# Patient Record
Sex: FEMALE | Race: BLACK OR AFRICAN AMERICAN | NOT HISPANIC OR LATINO | Employment: FULL TIME | URBAN - METROPOLITAN AREA
[De-identification: names, ages, dates, MRNs, and addresses within clinical notes are randomized per-mention and may not be internally consistent; named-entity substitution may affect disease eponyms.]

---

## 2017-05-14 ENCOUNTER — HOSPITAL ENCOUNTER (EMERGENCY)
Facility: HOSPITAL | Age: 42
Discharge: HOME | End: 2017-05-14
Attending: EMERGENCY MEDICINE
Payer: MEDICAID

## 2017-05-14 VITALS
HEIGHT: 64 IN | OXYGEN SATURATION: 97 % | RESPIRATION RATE: 18 BRPM | DIASTOLIC BLOOD PRESSURE: 76 MMHG | HEART RATE: 70 BPM | TEMPERATURE: 98 F | BODY MASS INDEX: 23.39 KG/M2 | WEIGHT: 137 LBS | SYSTOLIC BLOOD PRESSURE: 157 MMHG

## 2017-05-14 DIAGNOSIS — R11.2 NAUSEA AND VOMITING, INTRACTABILITY OF VOMITING NOT SPECIFIED, UNSPECIFIED VOMITING TYPE: Primary | ICD-10-CM

## 2017-05-14 DIAGNOSIS — N12 PYELONEPHRITIS: ICD-10-CM

## 2017-05-14 DIAGNOSIS — D25.9 UTERINE LEIOMYOMA, UNSPECIFIED LOCATION: ICD-10-CM

## 2017-05-14 DIAGNOSIS — N93.9 VAGINAL BLEEDING: ICD-10-CM

## 2017-05-14 LAB
ALBUMIN SERPL BCP-MCNC: 4 G/DL
ALP SERPL-CCNC: 56 U/L
ALT SERPL W/O P-5'-P-CCNC: 8 U/L
ANION GAP SERPL CALC-SCNC: 9 MMOL/L
AST SERPL-CCNC: 13 U/L
B-HCG UR QL: NEGATIVE
BACTERIA #/AREA URNS HPF: ABNORMAL /HPF
BASOPHILS # BLD AUTO: 0.01 K/UL
BASOPHILS NFR BLD: 0.1 %
BILIRUB SERPL-MCNC: 0.7 MG/DL
BILIRUB UR QL STRIP: NEGATIVE
BUN SERPL-MCNC: 7 MG/DL
CALCIUM SERPL-MCNC: 10.3 MG/DL
CHLORIDE SERPL-SCNC: 102 MMOL/L
CLARITY UR: ABNORMAL
CO2 SERPL-SCNC: 26 MMOL/L
COLOR UR: YELLOW
CREAT SERPL-MCNC: 0.8 MG/DL
CTP QC/QA: YES
DIFFERENTIAL METHOD: ABNORMAL
EOSINOPHIL # BLD AUTO: 0.1 K/UL
EOSINOPHIL NFR BLD: 0.9 %
ERYTHROCYTE [DISTWIDTH] IN BLOOD BY AUTOMATED COUNT: 12.7 %
EST. GFR  (AFRICAN AMERICAN): >60 ML/MIN/1.73 M^2
EST. GFR  (NON AFRICAN AMERICAN): >60 ML/MIN/1.73 M^2
GLUCOSE SERPL-MCNC: 103 MG/DL
GLUCOSE UR QL STRIP: NEGATIVE
HCT VFR BLD AUTO: 32.6 %
HGB BLD-MCNC: 11.3 G/DL
HGB UR QL STRIP: ABNORMAL
HYALINE CASTS #/AREA URNS LPF: 0 /LPF
KETONES UR QL STRIP: ABNORMAL
LEUKOCYTE ESTERASE UR QL STRIP: ABNORMAL
LIPASE SERPL-CCNC: 3 U/L
LYMPHOCYTES # BLD AUTO: 1.1 K/UL
LYMPHOCYTES NFR BLD: 14.1 %
MCH RBC QN AUTO: 30 PG
MCHC RBC AUTO-ENTMCNC: 34.7 %
MCV RBC AUTO: 87 FL
MICROSCOPIC COMMENT: ABNORMAL
MONOCYTES # BLD AUTO: 0.6 K/UL
MONOCYTES NFR BLD: 7.5 %
NEUTROPHILS # BLD AUTO: 6.2 K/UL
NEUTROPHILS NFR BLD: 77.3 %
NITRITE UR QL STRIP: POSITIVE
PH UR STRIP: 6 [PH] (ref 5–8)
PLATELET # BLD AUTO: 345 K/UL
PMV BLD AUTO: 9.2 FL
POTASSIUM SERPL-SCNC: 3.2 MMOL/L
PROT SERPL-MCNC: 8.4 G/DL
PROT UR QL STRIP: ABNORMAL
RBC # BLD AUTO: 3.77 M/UL
RBC #/AREA URNS HPF: >100 /HPF (ref 0–4)
SODIUM SERPL-SCNC: 137 MMOL/L
SP GR UR STRIP: 1.01 (ref 1–1.03)
URN SPEC COLLECT METH UR: ABNORMAL
UROBILINOGEN UR STRIP-ACNC: ABNORMAL EU/DL
WBC # BLD AUTO: 8.02 K/UL
WBC #/AREA URNS HPF: >100 /HPF (ref 0–5)
WBC CLUMPS URNS QL MICRO: ABNORMAL

## 2017-05-14 PROCEDURE — 81025 URINE PREGNANCY TEST: CPT | Performed by: EMERGENCY MEDICINE

## 2017-05-14 PROCEDURE — 87186 SC STD MICRODIL/AGAR DIL: CPT

## 2017-05-14 PROCEDURE — 87077 CULTURE AEROBIC IDENTIFY: CPT

## 2017-05-14 PROCEDURE — 25000003 PHARM REV CODE 250: Performed by: NURSE PRACTITIONER

## 2017-05-14 PROCEDURE — 81000 URINALYSIS NONAUTO W/SCOPE: CPT

## 2017-05-14 PROCEDURE — 87086 URINE CULTURE/COLONY COUNT: CPT

## 2017-05-14 PROCEDURE — 99285 EMERGENCY DEPT VISIT HI MDM: CPT | Mod: 25

## 2017-05-14 PROCEDURE — 87088 URINE BACTERIA CULTURE: CPT

## 2017-05-14 PROCEDURE — 85025 COMPLETE CBC W/AUTO DIFF WBC: CPT

## 2017-05-14 PROCEDURE — 96375 TX/PRO/DX INJ NEW DRUG ADDON: CPT

## 2017-05-14 PROCEDURE — 63600175 PHARM REV CODE 636 W HCPCS: Performed by: NURSE PRACTITIONER

## 2017-05-14 PROCEDURE — 80053 COMPREHEN METABOLIC PANEL: CPT

## 2017-05-14 PROCEDURE — 83690 ASSAY OF LIPASE: CPT

## 2017-05-14 PROCEDURE — 96365 THER/PROPH/DIAG IV INF INIT: CPT

## 2017-05-14 RX ORDER — NAPROXEN 500 MG/1
500 TABLET ORAL 2 TIMES DAILY
COMMUNITY
End: 2021-08-24

## 2017-05-14 RX ORDER — CEPHALEXIN 500 MG/1
500 CAPSULE ORAL EVERY 12 HOURS
Qty: 20 CAPSULE | Refills: 0 | Status: SHIPPED | OUTPATIENT
Start: 2017-05-14 | End: 2017-05-24

## 2017-05-14 RX ORDER — ONDANSETRON 2 MG/ML
4 INJECTION INTRAMUSCULAR; INTRAVENOUS
Status: COMPLETED | OUTPATIENT
Start: 2017-05-14 | End: 2017-05-14

## 2017-05-14 RX ORDER — HYDROMORPHONE HYDROCHLORIDE 2 MG/ML
1 INJECTION, SOLUTION INTRAMUSCULAR; INTRAVENOUS; SUBCUTANEOUS
Status: COMPLETED | OUTPATIENT
Start: 2017-05-14 | End: 2017-05-14

## 2017-05-14 RX ORDER — HYDROCODONE BITARTRATE AND ACETAMINOPHEN 5; 325 MG/1; MG/1
1 TABLET ORAL EVERY 4 HOURS PRN
Qty: 8 TABLET | Refills: 0 | Status: SHIPPED | OUTPATIENT
Start: 2017-05-14 | End: 2017-05-24

## 2017-05-14 RX ORDER — NAPROXEN 500 MG/1
500 TABLET ORAL 2 TIMES DAILY WITH MEALS
Qty: 60 TABLET | Refills: 0 | Status: SHIPPED | OUTPATIENT
Start: 2017-05-14 | End: 2018-03-19

## 2017-05-14 RX ORDER — ONDANSETRON 4 MG/1
4 TABLET, ORALLY DISINTEGRATING ORAL EVERY 8 HOURS PRN
Qty: 15 TABLET | Refills: 0 | Status: SHIPPED | OUTPATIENT
Start: 2017-05-14 | End: 2018-03-19

## 2017-05-14 RX ADMIN — SODIUM CHLORIDE 1000 ML: 0.9 INJECTION, SOLUTION INTRAVENOUS at 09:05

## 2017-05-14 RX ADMIN — CEFTRIAXONE 1 G: 1 INJECTION, SOLUTION INTRAVENOUS at 09:05

## 2017-05-14 RX ADMIN — ONDANSETRON 4 MG: 2 INJECTION INTRAMUSCULAR; INTRAVENOUS at 09:05

## 2017-05-14 RX ADMIN — HYDROMORPHONE HYDROCHLORIDE 1 MG: 2 INJECTION INTRAMUSCULAR; INTRAVENOUS; SUBCUTANEOUS at 09:05

## 2017-05-14 NOTE — DISCHARGE INSTRUCTIONS
Please get plenty of rest and drink lots of water to stay well-hydrated.    Try to start with clear liquids, and if tolerated, you can graduate to solid foods.  Eat a bland diet; avoid eating spicy, greasy, and acidic foods (see handout).    Take Keflex twice daily for 10 days for your kidney infection.    Take zofran for nausea or vomiting.    You should take naproxen for pain and inflammation, with food.  Never take this medication on an empty stomach.  You can take Norco for breakthrough pain, as needed.  Norco may cause drowsiness, so please use with caution.    Follow up with your regular doctor in 2-3 days for further evaluation of your symptoms, if they persist. If you do not have a regular doctor, you can make an appointment at the number above, or refer to our community resources page for nearby clinics.      Return to the ER for any new/worsening symptoms.

## 2017-05-14 NOTE — ED TRIAGE NOTES
Reports LMP 5/1/17. Had PAP on 5/11/17. Has had vag bleeding since PAP. ALSO, c/o vomiting , back pain , lower abd pain x 1 day.

## 2017-05-14 NOTE — ED PROVIDER NOTES
"Encounter Date: 5/14/2017    SCRIBE #1 NOTE: I, Marcos Madsen, am scribing for, and in the presence of,  Yu Graham NP. I have scribed the following portions of the note - Other sections scribed: HPI and ROS.       History     Chief Complaint   Patient presents with    Emesis     pt c/o vomiting,lower back pain, vaginal bleeding and cramping since paps smear on the 11th.     Review of patient's allergies indicates:  No Known Allergies  HPI Comments: Chief Complaint: Pelvic Pain    HPI: This is a 40 y/o female with HTN and a hx of fibroids that comes to the ED c/o pelvic pain, back pain, and vaginal bleeding. Pt reports that symptoms started the day after she had a routine pelvic exam with , her OBGYN.  During exam, pt reports that she was asymptomatic, but she awoke the next morning with severe pelvic pain and vaginal bleeding.  She relates concern because she already had her menses on 5/01/17 and the bleeding was moderate and the pelvic pain was so severe that she reports she was "doubled over in pain".  She reports associated lack of appetite, nausea, chills, nausea, vomiting, and bodyaches.  She reports using 3 tampons yesterday and saturating the last tampon within 2 hours. She denies prior treatments or medications. She denies urinary symptoms, diarrhea, blood in stool.    The history is provided by the patient. No  was used.     Past Medical History:   Diagnosis Date    Hypertension      Past Surgical History:   Procedure Laterality Date    HERNIA REPAIR      TUBAL LIGATION       History reviewed. No pertinent family history.  Social History   Substance Use Topics    Smoking status: Never Smoker    Smokeless tobacco: None    Alcohol use No     Review of Systems   Constitutional: Positive for appetite change and chills. Negative for fever.   HENT: Negative for congestion and sore throat.    Respiratory: Negative for cough and shortness of breath.    Cardiovascular: Negative " for chest pain and palpitations.   Gastrointestinal: Positive for nausea and vomiting. Negative for abdominal pain, constipation and diarrhea.   Genitourinary: Positive for pelvic pain and vaginal bleeding. Negative for difficulty urinating and dysuria.   Musculoskeletal: Positive for back pain and myalgias.   Skin: Negative for rash.   Neurological: Negative for dizziness, weakness, light-headedness and headaches.       Physical Exam   Initial Vitals   BP Pulse Resp Temp SpO2   05/14/17 0725 05/14/17 0725 05/14/17 0725 05/14/17 0725 05/14/17 0725   135/91 88 18 98.6 °F (37 °C) 100 %     Physical Exam    Nursing note and vitals reviewed.  Constitutional: Vital signs are normal. She appears well-developed and well-nourished. She is not diaphoretic. She is active and cooperative.  Non-toxic appearance.   She appears ill and uncomfortable. Dry-heaving into emesis bag during exam.   Cardiovascular: Normal heart sounds.   Pulmonary/Chest: Effort normal and breath sounds normal. No respiratory distress. She has no wheezes. She has no rhonchi. She has no rales.   Abdominal: Soft. Normal appearance. She exhibits no distension and no mass. There is tenderness (lower abdomen/pelvic). There is no rigidity, no rebound, no guarding, no CVA tenderness, no tenderness at McBurney's point and negative Alonzo's sign. No hernia.   Genitourinary: Pelvic exam was performed with patient supine. There is no rash or tenderness on the right labia. There is no rash or tenderness on the left labia. Cervix exhibits no motion tenderness, no discharge and no friability. Right adnexum displays no mass. Left adnexum displays no mass. There is bleeding (moderate dark blood in vault) in the vagina. No tenderness in the vagina. No signs of injury around the vagina. No vaginal discharge found.   Neurological: She is alert and oriented to person, place, and time.   Skin: Skin is warm and dry. No pallor.   Psychiatric: She has a normal mood and affect.          ED Course   Procedures  Labs Reviewed   CBC W/ AUTO DIFFERENTIAL - Abnormal; Notable for the following:        Result Value    RBC 3.77 (*)     Hemoglobin 11.3 (*)     Hematocrit 32.6 (*)     Gran% 77.3 (*)     Lymph% 14.1 (*)     All other components within normal limits   COMPREHENSIVE METABOLIC PANEL - Abnormal; Notable for the following:     Potassium 3.2 (*)     ALT 8 (*)     All other components within normal limits   LIPASE - Abnormal; Notable for the following:     Lipase 3 (*)     All other components within normal limits   URINALYSIS - Abnormal; Notable for the following:     Appearance, UA Hazy (*)     Protein, UA 2+ (*)     Ketones, UA 1+ (*)     Occult Blood UA 3+ (*)     Nitrite, UA Positive (*)     Urobilinogen, UA 4.0-6.0 (*)     Leukocytes, UA 2+ (*)     All other components within normal limits   URINALYSIS MICROSCOPIC - Abnormal; Notable for the following:     RBC, UA >100 (*)     WBC, UA >100 (*)     WBC Clumps, UA Moderate (*)     Bacteria, UA Moderate (*)     All other components within normal limits   CULTURE, URINE   POCT URINE PREGNANCY                Additional MDM:   Comments: This is an urgent evaluation of a 42 y/o female that presents to the ER with pelvic pain and vaginal bleeding x 2 days. Pt reports associated pain that radiates to her lumbar back, nausea, vomiting, bodyaches.  She appears ill, but is afebrile with normal VS.  There is moderate tenderness over the lower abdomen and pelvis. Negative murphys and mcburney's and no peritoneal signs. Her UPT is negative.  DDx included: UTI, renal colic, torsion, ovarian cysts, fibroids, endometriosis, vaginal infection. Appendicitis, diverticulitis, bowel obstruction, and colitis considered less likely.  She was referred for labs, urine, pelvic, and pelvic ultrasound.  Pelvic exam reveals moderate dark blood in the vault, but is otherwise unremarkable with no adnexal masses or evidence of infectious process.    Pelvic ultrasound  revealed 2 uterine fibroids in the fundus, but otherwise was normal.  There is no evidence of torsion, free fluid, ovarian cysts.  Urine was consistent with infection with 2+ leukocytes, pyuria, WBC clumps, bacteria, and positive nitrates.  Culture sent.  Her labs were otherwise unremarkable, with no significant leukocytosis, anemia, renal sufficiency, electrolyte disturbance, or transaminitis.  Based on the above, I suspect her symptoms are most likely due to an early pyelonephritis.  I highly doubt acute surgical abdomen such as appendicitis or diverticulitis at this time.  I see no indication for further imaging.  Patient was given 1 g of Rocephin in the ED and will be discharged with prescription for Keflex.  Pending culture report.  She appears more comfortable on reevaluation and is tolerating liquids.  I advised the patient follow-up with her primary care doctor within 2-3 days for further evaluation of her symptoms.  She was given strict return precautions for any new or worsening symptoms or concerns, which she verbalized understanding and adherence.  She was encouraged supportive care: Oral hydration, rest, bland diet.  Rx antiemetics for supportive care.  Case discussed with attending, , and he is in agreement with plan. Stable for discharge and outpatient follow.  .          Scribe Attestation:   Scribe #1: I performed the above scribed service and the documentation accurately describes the services I performed. I attest to the accuracy of the note.    Attending Attestation:     Physician Attestation Statement for NP/PA:   I discussed this assessment and plan of this patient with the NP/PA, but I did not personally examine the patient. The face to face encounter was performed by the NP/PA.    Other NP/PA Attestation Additions:      Medical Decision Making: This is the emergent evaluation of a 41-year-old female presents the emergency department for evaluation of vomiting, lower back pain,  vaginal bleeding, cramping.  I agree with the treatment plan and evaluation as outlined by the midlevel provider.  No findings to suggest acute appendicitis at this time.  Urinalysis reveals findings concerning for urinary tract infection which is likely pyelonephritis in the setting of the patient's other symptoms.  Additionally, ultrasound of the pelvis revealed uterine fibroids which the patient is known to have.  Patient is not vomiting at this time.  She is afebrile and nontoxic appearing.  She does not appear septic.  She is safe and stable for outpatient treatment.  She did receive IV antibiotics in the emergency department for discharge.  She was advised to follow-up with her PCP this week and return for any new or worsening symptoms such as fever, intractable vomiting, or different/worsening pain.       Physician Attestation for Scribe:  Physician Attestation Statement for Scribe #1: I, Yu Graham NP, reviewed documentation, as scribed by Marcos Madsen in my presence, and it is both accurate and complete.                 ED Course     Clinical Impression:   The primary encounter diagnosis was Nausea and vomiting, intractability of vomiting not specified, unspecified vomiting type. Diagnoses of Vaginal bleeding, Uterine leiomyoma, unspecified location, and Pyelonephritis were also pertinent to this visit.    Disposition:   Disposition: Discharged  Condition: Stable       Yu Graham NP  05/15/17 0337

## 2017-05-14 NOTE — ED AVS SNAPSHOT
[facility]  [place] [address]               Latonia Zepeda   2017  7:34 AM   ED    Description:  Female : 1975   Department:  [facility]           Your Care was Coordinated By:     Provider Role From To    Timi Gillis Jr., MD Attending Provider 17 0739 --    Yu Graham NP Nurse Practitioner 17 0739 --      Reason for Visit     Emesis           Diagnoses this Visit        Comments    Nausea and vomiting, intractability of vomiting not specified, unspecified vomiting type    -  Primary     Vaginal bleeding         Uterine leiomyoma, unspecified location         Pyelonephritis           ED Disposition     ED Disposition Condition Comment    Discharge             To Do List           Follow-up Information     Follow up with [facility].    Specialty:  Emergency Medicine    Why:  If symptoms worsen    Contact information:    [address] [place] [address] [phone]        Schedule an appointment as soon as possible for a visit with Nilson Tolentino MD.    Specialty:  Family Medicine    Why:  PRIMARY CARE - CALL FOR APPOINTMENT FOR RE-EVALUATION OR ROUTINE FOLLOW    Contact information:    [address] [facility] [id number] [phone]         These Medications        Disp Refills Start End    cephALEXin (KEFLEX) 500 MG capsule 20 capsule 0 2017    Take 1 capsule (500 mg total) by mouth every 12 (twelve) hours. - Oral    ondansetron (ZOFRAN-ODT) 4 MG TbDL 15 tablet 0 2017     Take 1 tablet (4 mg total) by mouth every 8 (eight) hours as needed. - Oral    hydrocodone-acetaminophen 5-325mg (NORCO) 5-325 mg per tablet 8 tablet 0 2017    Take 1 tablet by mouth every 4 (four) hours as needed (Breakthrough pain). May cause drowsiness - Oral    naproxen (NAPROSYN) 500 MG tablet 60 tablet 0 2017     Take 1 tablet (500 mg total) by  mouth 2 (two) times daily with meals. - Oral      [facility]     [facility] Nurse Care Line - 24/7 Assistance  Unless otherwise directed by your provider, please contact [facility], our nurse care line that is available for 24/7 assistance.     Registered nurses in the [facility] provide: appointment scheduling, clinical advisement, health education, and other advisory services.  Call: [phone] (toll free)               Medications           Message regarding Medications     Verify the changes and/or additions to your medication regime listed below are the same as discussed with your clinician today.  If any of these changes or additions are incorrect, please notify your healthcare provider.        START taking these NEW medications        Refills    cephALEXin (KEFLEX) 500 MG capsule 0    Sig: Take 1 capsule (500 mg total) by mouth every 12 (twelve) hours.    Class: Print    Route: Oral    ondansetron (ZOFRAN-ODT) 4 MG TbDL 0    Sig: Take 1 tablet (4 mg total) by mouth every 8 (eight) hours as needed.    Class: Print    Route: Oral    hydrocodone-acetaminophen 5-325mg (NORCO) 5-325 mg per tablet 0    Sig: Take 1 tablet by mouth every 4 (four) hours as needed (Breakthrough pain). May cause drowsiness    Class: Print    Route: Oral    naproxen (NAPROSYN) 500 MG tablet 0    Sig: Take 1 tablet (500 mg total) by mouth 2 (two) times daily with meals.    Class: Print    Route: Oral      These medications were administered today        Dose Freq    hydromorphone (PF) injection 1 mg 1 mg ED 1 Time    Sig: Inject 0.5 mLs (1 mg total) into the vein ED 1 Time.    Class: Normal    Route: Intravenous    ondansetron injection 4 mg 4 mg ED 1 Time    Sig: Inject 4 mg into the vein ED 1 Time.    Class: Normal    Route: Intravenous    sodium chloride 0.9% bolus 1,000 mL 1,000 mL ED 1 Time    Sig: Inject 1,000 mLs into the vein ED 1 Time.    Class: Normal    Route: Intravenous    cefTRIAXone (ROCEPHIN)  "1 g in dextrose 5 % 50 mL IVPB 1 g ED 1 Time    Sig: Inject 50 mLs (1 g total) into the vein ED 1 Time.    Class: Normal    Route: Intravenous    ondansetron injection 4 mg 4 mg ED 1 Time    Sig: Inject 4 mg into the vein ED 1 Time.    Class: Normal    Route: Intravenous           Verify that the below list of medications is an accurate representation of the medications you are currently taking.  If none reported, the list may be blank. If incorrect, please contact your healthcare provider. Carry this list with you in case of emergency.           Current Medications     naproxen (EC NAPROSYN) 500 MG EC tablet Take 500 mg by mouth 2 (two) times daily.    cephALEXin (KEFLEX) 500 MG capsule Take 1 capsule (500 mg total) by mouth every 12 (twelve) hours.    hydrocodone-acetaminophen 5-325mg (NORCO) 5-325 mg per tablet Take 1 tablet by mouth every 4 (four) hours as needed (Breakthrough pain). May cause drowsiness    naproxen (NAPROSYN) 500 MG tablet Take 1 tablet (500 mg total) by mouth 2 (two) times daily with meals.    ondansetron (ZOFRAN-ODT) 4 MG TbDL Take 1 tablet (4 mg total) by mouth every 8 (eight) hours as needed.           Clinical Reference Information           Your Vitals Were     BP Pulse Temp Resp Height Weight    157/76 (BP Location: Left arm, Patient Position: Sitting, BP Method: Automatic) 70 97.8 °F (36.6 °C) (Oral) 18 5' 4" (1.626 m) 62.1 kg (137 lb)    Last Period SpO2 BMI          05/01/2017 97% 23.52 kg/m2        Allergies as of 5/14/2017     No Known Allergies      Immunizations Administered on Date of Encounter - 5/14/2017     None      ED Micro, Lab, POCT     Start Ordered       Status Ordering Provider    05/14/17 0940 05/14/17 0939  Urine culture **CANNOT BE ORDERED STAT**  Once      In process     05/14/17 0806 05/14/17 0806  Urinalysis Microscopic  Once      Final result     05/14/17 0802 05/14/17 0806  CBC W/ AUTO DIFFERENTIAL  STAT      Final result     05/14/17 0802 05/14/17 0806  Comp. " Metabolic Panel  STAT      Final result     05/14/17 0802 05/14/17 0806  Lipase  Once      Final result     05/14/17 0802 05/14/17 0806  Urinalysis - Clean Catch  STAT      Final result     05/14/17 0728 05/14/17 0727  POCT urine pregnancy  Once      Final result       ED Imaging Orders     Start Ordered       Status Ordering Provider    05/14/17 0805 05/14/17 0806  US Pelvis Comp with Transvag NON-OB (xpd  1 time imaging      Final result         Discharge Instructions       Please get plenty of rest and drink lots of water to stay well-hydrated.    Try to start with clear liquids, and if tolerated, you can graduate to solid foods.  Eat a bland diet; avoid eating spicy, greasy, and acidic foods (see handout).    Take Keflex twice daily for 10 days for your kidney infection.    Take zofran for nausea or vomiting.    You should take naproxen for pain and inflammation, with food.  Never take this medication on an empty stomach.  You can take Norco for breakthrough pain, as needed.  Norco may cause drowsiness, so please use with caution.    Follow up with your regular doctor in 2-3 days for further evaluation of your symptoms, if they persist. If you do not have a regular doctor, you can make an appointment at the number above, or refer to our community resources page for nearby clinics.      Return to the ER for any new/worsening symptoms.        Discharge References/Attachments     PYELONEPHRITIS, DISCHARGE INSTRUCTIONS (ENGLISH)    PYELONEPHRITIS, FEMALE (ADULT) (ENGLISH)      [facility] Sign-Up     Activating your [facility] account is as easy as 1-2-3!     1) Visit [email or web address], select Sign Up Now, enter this activation code and your date of birth, then select Next.  [email or web address]  Expires: 6/28/2017 11:05 AM      2) Create a username and password to use when you visit [facility] in the future and select a security question in case you lose your password and select Next.    3) Enter your e-mail address and  click Sign Up!    Additional Information  If you have questions, please e-mail [email or web address] or call [phone] to talk to our [facility] staff. Remember, [facility] is NOT to be used for urgent needs. For medical emergencies, dial 911.          [facility] complies with applicable Federal civil rights laws and does not discriminate on the basis of race, color, national origin, age, disability, or sex.        Language Assistance Services     ATTENTION: Language assistance services are available, free of charge. Please call [phone].      ATENCIÓN: Si habla español, tiene a samuel disposición servicios gratuitos de asistencia lingüística. Llame al [phone].     CHÚ Ý: N?u b?n nói Ti?ng Vi?t, có các d?ch v? h? tr? ngôn ng? mi?n phí dành cho b?n. G?i s? [phone].

## 2017-05-16 LAB — BACTERIA UR CULT: NORMAL

## 2018-03-09 ENCOUNTER — HOSPITAL ENCOUNTER (EMERGENCY)
Facility: HOSPITAL | Age: 43
Discharge: HOME | End: 2018-03-09
Attending: EMERGENCY MEDICINE
Payer: MEDICAID

## 2018-03-09 VITALS
TEMPERATURE: 97 F | RESPIRATION RATE: 19 BRPM | SYSTOLIC BLOOD PRESSURE: 137 MMHG | BODY MASS INDEX: 24.92 KG/M2 | WEIGHT: 146 LBS | OXYGEN SATURATION: 99 % | HEIGHT: 64 IN | HEART RATE: 73 BPM | DIASTOLIC BLOOD PRESSURE: 96 MMHG

## 2018-03-09 DIAGNOSIS — L30.9 DERMATITIS: Primary | ICD-10-CM

## 2018-03-09 LAB
B-HCG UR QL: NEGATIVE
CTP QC/QA: YES

## 2018-03-09 PROCEDURE — 96372 THER/PROPH/DIAG INJ SC/IM: CPT

## 2018-03-09 PROCEDURE — 99283 EMERGENCY DEPT VISIT LOW MDM: CPT | Mod: 25

## 2018-03-09 PROCEDURE — 86592 SYPHILIS TEST NON-TREP QUAL: CPT

## 2018-03-09 PROCEDURE — 63600175 PHARM REV CODE 636 W HCPCS: Performed by: PHYSICIAN ASSISTANT

## 2018-03-09 PROCEDURE — 81025 URINE PREGNANCY TEST: CPT | Performed by: PHYSICIAN ASSISTANT

## 2018-03-09 RX ORDER — TRIAMCINOLONE ACETONIDE 1 MG/G
CREAM TOPICAL 2 TIMES DAILY
Qty: 45 G | Refills: 0 | Status: SHIPPED | OUTPATIENT
Start: 2018-03-09 | End: 2018-03-19

## 2018-03-09 RX ORDER — CYPROHEPTADINE HYDROCHLORIDE 4 MG/1
4 TABLET ORAL 3 TIMES DAILY PRN
COMMUNITY
End: 2021-08-24

## 2018-03-09 RX ORDER — HYDROXYZINE HYDROCHLORIDE 25 MG/1
25 TABLET, FILM COATED ORAL EVERY 6 HOURS
Qty: 12 TABLET | Refills: 0 | OUTPATIENT
Start: 2018-03-09 | End: 2021-08-24

## 2018-03-09 RX ORDER — HYDROXYZINE PAMOATE 25 MG/1
25 CAPSULE ORAL
Status: DISCONTINUED | OUTPATIENT
Start: 2018-03-09 | End: 2018-03-09

## 2018-03-09 RX ORDER — DIPHENHYDRAMINE HYDROCHLORIDE 50 MG/ML
25 INJECTION INTRAMUSCULAR; INTRAVENOUS
Status: COMPLETED | OUTPATIENT
Start: 2018-03-09 | End: 2018-03-09

## 2018-03-09 RX ADMIN — DIPHENHYDRAMINE HYDROCHLORIDE 25 MG: 50 INJECTION, SOLUTION INTRAMUSCULAR; INTRAVENOUS at 10:03

## 2018-03-10 NOTE — ED NOTES
"Presented pt. With order medication ( hydroxyzine) and she stated she perferred to have a shot and not the pill. Pt. States she has been taking benadryl all day without any results and would like the "shot". Explained to pt. That she has not taken hydroxyzine, pt. States she has a hard time swollen pills. Provider made aware of pt. Preference.   "

## 2018-03-10 NOTE — ED PROVIDER NOTES
"Encounter Date: 3/9/2018    SCRIBE #1 NOTE: I, Ame Arianna, am scribing for, and in the presence of,  Vince Romero PA-C. I have scribed the following portions of the note - Other sections scribed: HPI and ROS.       History     Chief Complaint   Patient presents with    Rash     Pt reports rash on both hands, feet, & left arm onset 2 days ago with itching, been taking benadryl no relief.     CC: Rash    HPI: The pt is a 42 y.o. F with a PMHx of HTN and chlamydia who presents to the ED c/o rash to L arm, L hand, R hand, and L foot w/ constant itch for the past 2 days. Pt reports that she also noticed some bumps on her L foot. Pt says that she has taken benadryl without relief. She otherwise denies recent contact w/ anyone who has rashes as well as fever, arthralgia, sore throat, dysuria, back/chest rash and other associated symptoms.      The history is provided by the patient. No  was used.     Review of patient's allergies indicates:  No Known Allergies  Past Medical History:   Diagnosis Date    Hypertension      Past Surgical History:   Procedure Laterality Date    HERNIA REPAIR      HYSTERECTOMY      TUBAL LIGATION       History reviewed. No pertinent family history.  Social History   Substance Use Topics    Smoking status: Never Smoker    Smokeless tobacco: Not on file    Alcohol use No     Review of Systems   Constitutional: Negative for chills, diaphoresis and fever.   HENT: Negative for ear pain and sore throat.    Eyes: Negative for redness.   Respiratory: Negative for cough and shortness of breath.    Cardiovascular: Negative for chest pain.   Gastrointestinal: Negative for abdominal pain, diarrhea, nausea and vomiting.   Genitourinary: Negative for dysuria.   Musculoskeletal: Negative for arthralgias and back pain.   Skin: Positive for rash (L arm, L hand, R hand, L foot).        (+) L foot "bumps"  (-) back or chest rash   Neurological: Negative for headaches.       Physical Exam "     Initial Vitals [03/09/18 2055]   BP Pulse Resp Temp SpO2   136/85 88 16 98.7 °F (37.1 °C) 99 %      MAP       102         Physical Exam    Vitals reviewed.  Constitutional: She appears well-developed and well-nourished. She is not diaphoretic. No distress.   HENT:   Head: Normocephalic and atraumatic.   Right Ear: External ear normal.   Left Ear: External ear normal.   Nose: Nose normal.   Eyes: Conjunctivae are normal. No scleral icterus.   Neck: Normal range of motion. Neck supple.   Cardiovascular: Normal rate, regular rhythm, normal heart sounds and intact distal pulses.   Pulmonary/Chest: Breath sounds normal. No respiratory distress. She has no wheezes. She has no rhonchi. She has no rales. She exhibits no tenderness.   Musculoskeletal: Normal range of motion.   Neurological: She is alert and oriented to person, place, and time.   Skin: Skin is warm and dry. Rash noted.   Papular pruritic rash to bilateral arms, concentrated in the flexural surfaces of the elbows, also found on the palms and soles bilaterally.  The lesions are dry papular lesions with some crusting and excoriations.  No large bulla or sloughing.         ED Course   Procedures  Labs Reviewed   RPR   POCT URINE PREGNANCY             Medical Decision Making:   Initial Assessment:   42-year-old female with no significant history complains of pruritic papular rash to arms, hands, and feet.  She denies arthralgias, fever, shortness of breath.  No history of eczema, however, her son does have eczema.  She presents well-appearing in no distress, afebrile, with reassuring vital signs.  She has a papular dry pruritic rash to bilateral hands including the palms, and most severe on the flexor surfaces of the elbows.  She also has some papules on the soles of her feet.  No lesions to the interdigital skin.  No linear lesions.  No bolus lesions or skin sloughing.  Most consistent with atopic dermatitis, but will screen for syphilis.  Differential  Diagnosis:   Atopic dermatitis, other dermatitis, and less likely syphilis, infestation  ED Management:  Patient treated with antihistamine and topical steroid.  She was discharged with return precautions and given dermatology information for follow-up as needed.  She verbalized understanding and agreed with plan.            Scribe Attestation:   Scribe #1: I performed the above scribed service and the documentation accurately describes the services I performed. I attest to the accuracy of the note.    Attending Attestation:           Physician Attestation for Scribe:  Physician Attestation Statement for Scribe #1: I, Vince Romero PA-C, reviewed documentation, as scribed by Ame Keller in my presence, and it is both accurate and complete.                    Clinical Impression:   The encounter diagnosis was Dermatitis.                           Vince Romero PA-C  03/09/18 0288

## 2018-03-12 LAB — RPR SER QL: NORMAL

## 2018-03-19 ENCOUNTER — HOSPITAL ENCOUNTER (EMERGENCY)
Facility: HOSPITAL | Age: 43
Discharge: HOME | End: 2018-03-19
Attending: EMERGENCY MEDICINE
Payer: MEDICAID

## 2018-03-19 VITALS
OXYGEN SATURATION: 98 % | HEIGHT: 65 IN | DIASTOLIC BLOOD PRESSURE: 63 MMHG | SYSTOLIC BLOOD PRESSURE: 134 MMHG | WEIGHT: 130 LBS | TEMPERATURE: 98 F | BODY MASS INDEX: 21.66 KG/M2 | RESPIRATION RATE: 20 BRPM | HEART RATE: 86 BPM

## 2018-03-19 DIAGNOSIS — L03.115 CELLULITIS OF RIGHT FOOT: ICD-10-CM

## 2018-03-19 DIAGNOSIS — B35.3 TINEA PEDIS OF BOTH FEET: ICD-10-CM

## 2018-03-19 DIAGNOSIS — B86 SCABIES: Primary | ICD-10-CM

## 2018-03-19 PROCEDURE — 99283 EMERGENCY DEPT VISIT LOW MDM: CPT

## 2018-03-19 RX ORDER — SULFAMETHOXAZOLE AND TRIMETHOPRIM 800; 160 MG/1; MG/1
1 TABLET ORAL 2 TIMES DAILY
Qty: 14 TABLET | Refills: 0 | Status: SHIPPED | OUTPATIENT
Start: 2018-03-19 | End: 2018-03-26

## 2018-03-19 RX ORDER — CLOTRIMAZOLE 1 %
CREAM (GRAM) TOPICAL
Qty: 15 G | Refills: 0 | OUTPATIENT
Start: 2018-03-19 | End: 2021-08-24

## 2018-03-19 RX ORDER — PERMETHRIN 50 MG/G
CREAM TOPICAL
Qty: 60 G | Refills: 1 | OUTPATIENT
Start: 2018-03-19 | End: 2021-08-24

## 2018-03-19 NOTE — ED TRIAGE NOTES
Seen here 10 days ago for rash patient says its not getting better and she googled it and thinks its scabies pharmacist says the medicine she has won't work on scabies itching

## 2018-03-19 NOTE — DISCHARGE INSTRUCTIONS
Please take all medications as prescribed.    The Elimite (Premethrin) cream should be applied from the neck down, leave on for 8 hours, then wash off; repeat in 1 week.  You should take off your bed sheets and clothing, place in a garbage bag, leave in the bag for 1 week, then wash. Any scabies mites will not survive this.    If your rash does not improve, please follow-up with a dermatologist.    Return to the ER for any new or concerning symptoms.

## 2018-03-19 NOTE — ED PROVIDER NOTES
Encounter Date: 3/19/2018    SCRIBE #1 NOTE: I, Jacob Yang, am scribing for, and in the presence of,  Beatris Redd PA-C. I have scribed the following portions of the note - Other sections scribed: HPI, ROS.       History     Chief Complaint   Patient presents with    Rash/scabies?     rash to body started last week; on hand and feet; states was seen here on March 9 and given rash meds but she googled it and she thinks she has scabies from touching dirty linen at work with no gloves     CC: Rash    41 y/o female with HTN presents to the ED c/o acute onset rash and itchiness to bilateral hands and feet that started on 3/7/18. The symptoms are severe (9/10) and have progressively worsened last wk. The patient reports pustules to her feet with associated purulent drainage. The patient self diagnosed herself with scabies after researching on the internet, so she attempted to soak in vinegar with no relief. The patient presented to this facility on 3/9/18 for similar symptoms where she was prescribed Atarax with mild relief to her itchiness and Kenalog cream with no relief to the rash. The patient denies fever, chills, or cough. No other symptoms reported.      The history is provided by the patient. No  was used.     Review of patient's allergies indicates:  No Known Allergies  Past Medical History:   Diagnosis Date    Hypertension      Past Surgical History:   Procedure Laterality Date    HERNIA REPAIR      HYSTERECTOMY      TUBAL LIGATION       History reviewed. No pertinent family history.  Social History   Substance Use Topics    Smoking status: Never Smoker    Smokeless tobacco: Never Used    Alcohol use No     Review of Systems   Constitutional: Negative for chills, diaphoresis, fatigue and fever.   HENT: Negative for congestion, ear pain, rhinorrhea and sore throat.    Eyes: Negative for redness.   Respiratory: Negative for cough and shortness of breath.    Cardiovascular:  Negative for chest pain.   Gastrointestinal: Negative for abdominal pain, diarrhea, nausea and vomiting.   Genitourinary: Negative for difficulty urinating, dysuria, vaginal bleeding and vaginal discharge.   Musculoskeletal: Negative for back pain.   Skin: Positive for rash (to bilateral hands and feet with associated itchiness). Negative for pallor and wound.        (+) pustules to feet with associated purulent drainage   Neurological: Negative for headaches.   Psychiatric/Behavioral: Negative for confusion. The patient is not nervous/anxious.        Physical Exam     Initial Vitals [03/19/18 1653]   BP Pulse Resp Temp SpO2   134/63 86 20 98.4 °F (36.9 °C) 98 %      MAP       86.67         Physical Exam    Nursing note and vitals reviewed.  Constitutional: Vital signs are normal. She appears well-developed and well-nourished. She is not diaphoretic. She is cooperative.  Non-toxic appearance. She does not have a sickly appearance. She does not appear ill. No distress.   HENT:   Head: Normocephalic and atraumatic.   Right Ear: Tympanic membrane, external ear and ear canal normal.   Left Ear: Tympanic membrane, external ear and ear canal normal.   Nose: Nose normal.   Mouth/Throat: Uvula is midline, oropharynx is clear and moist and mucous membranes are normal. No trismus in the jaw. No uvula swelling. No oropharyngeal exudate, posterior oropharyngeal edema or posterior oropharyngeal erythema.   Eyes: Conjunctivae, EOM and lids are normal. Pupils are equal, round, and reactive to light.   Neck: Trachea normal, normal range of motion, full passive range of motion without pain and phonation normal. Neck supple.   Cardiovascular: Normal rate, regular rhythm, normal heart sounds and intact distal pulses. Exam reveals no gallop and no friction rub.    No murmur heard.  Pulses:       Radial pulses are 2+ on the right side, and 2+ on the left side.        Dorsalis pedis pulses are 2+ on the right side, and 2+ on the left  "side.   Refill less than 2 seconds in the bilateral upper and lower extremities.   Pulmonary/Chest: Effort normal and breath sounds normal. No respiratory distress. She has no decreased breath sounds. She has no wheezes. She has no rhonchi. She has no rales.   Abdominal: Soft. Normal appearance and bowel sounds are normal. She exhibits no distension and no mass. There is no tenderness. There is no rigidity, no rebound and no guarding.   Musculoskeletal: Normal range of motion.   Neurological: She is alert and oriented to person, place, and time. She has normal strength. No cranial nerve deficit or sensory deficit. GCS eye subscore is 4. GCS verbal subscore is 5. GCS motor subscore is 6.   Skin: Skin is warm and dry. Capillary refill takes less than 2 seconds. Rash noted. No petechiae, no purpura and no abscess noted. Rash is papular. Rash is not nodular, not pustular and not urticarial. No erythema.        There is a pruritic, papular eruption with dry macules on the palmar and plantar surfaces bilaterally and in the webspaces. There is macerated weeping skin of the right 4th and 5th webspace that is tender to palpation and with movement of the toes.    Psychiatric: She has a normal mood and affect. Her speech is normal and behavior is normal. Judgment and thought content normal. Cognition and memory are normal.                         ED Course   Procedures  Labs Reviewed - No data to display          Medical Decision Making:   Initial Assessment:   This is an evaluation of a 42 y.o. female that presents to the Emergency Department for rash/scabies.  She reports a rash on bilateral palmar and plantar surfaces for 2-3 weeks.  She reports being seen in this emergency department and treated with triamcinolone cream and hydroxyzine with mild relief.  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  She reports severe itching and worsening of " "the "bumps" on her hands and feet.  She also reports breakdown of the skin between her right fourth and fifth toes following soaking her feet in clinic underwater.  She denies any further attempted txs.   ED Management:  Physical Exam shows a non-toxic, afebrile, and well appearing female. There is a pruritic, papular eruption with dry macules on the palmar and plantar surfaces bilaterally and in the webspaces. There is macerated weeping skin of the right 4th and 5th webspace that is tender to palpation and with movement of the toes. There is tenderness to palpation of the right dorsal foot near the 4th and 5th webspace; there is no streaking erythema.  There is white chalky dry skin in the web spaces of the toes bilaterally. There is no desquamation or vesicular eruption. There is no purulent drainage. See pictures included in note.    Vital Signs Are Reassuring. If available, previous records reviewed.     My overall impression is Scabies, Tinea pedis and cellulitis of the right foot. I considered, but at this time, do not suspect Atopic dermatitis, HSV, shingles, SJS, erythema multiforme, secondary syphilis.    D/C Meds: Bactrim DS BID x 7 days, Premethrin cream from neck down, leave on 8 hours, and wash off (repeat in 1 week), Lotrimin cream BID in toe web sapces x 1 week. Additional D/C Information: Recommended avoiding vinegar soaks and cooling bath water down.  Recommended moisturizing creams. The diagnosis, treatment plan, instructions for follow-up and reevaluation with Dermatologist, as well as ED return precautions were discussed and understanding was verbalized. All questions or concerns have been addressed. Patient was discharged home with an instructional sheet which gave not only information regarding the most likely diagnoses but also information regarding when to return to the emergency department for alarming symptoms and when to seek further care.        Other:   I have discussed this case with " another health care provider.       <> Summary of the Discussion: This case was discussed with Dr. Lopez who is in agreement with my assessment and plan.     Beatris Buck PA-C                        Clinical Impression:   The primary encounter diagnosis was Scabies. Diagnoses of Cellulitis of right foot and Tinea pedis of both feet were also pertinent to this visit.    Disposition:   Disposition: Discharged  Condition: Stable                        Beatris Buck PA-C  03/19/18 2138

## 2018-04-18 ENCOUNTER — HOSPITAL ENCOUNTER (EMERGENCY)
Facility: HOSPITAL | Age: 43
Discharge: HOME | End: 2018-04-18
Attending: EMERGENCY MEDICINE
Payer: MEDICAID

## 2018-04-18 VITALS
DIASTOLIC BLOOD PRESSURE: 86 MMHG | TEMPERATURE: 98 F | RESPIRATION RATE: 18 BRPM | BODY MASS INDEX: 23.6 KG/M2 | SYSTOLIC BLOOD PRESSURE: 144 MMHG | HEIGHT: 61 IN | OXYGEN SATURATION: 100 % | HEART RATE: 70 BPM | WEIGHT: 125 LBS

## 2018-04-18 DIAGNOSIS — B35.3 TINEA PEDIS OF RIGHT FOOT: Primary | ICD-10-CM

## 2018-04-18 LAB — POCT GLUCOSE: 118 MG/DL (ref 70–110)

## 2018-04-18 PROCEDURE — 99283 EMERGENCY DEPT VISIT LOW MDM: CPT

## 2018-04-18 PROCEDURE — 82962 GLUCOSE BLOOD TEST: CPT

## 2018-04-18 RX ORDER — DEXTROMETHORPHAN HYDROBROMIDE, GUAIFENESIN 5; 100 MG/5ML; MG/5ML
650 LIQUID ORAL EVERY 8 HOURS PRN
Qty: 30 TABLET | Refills: 0 | OUTPATIENT
Start: 2018-04-18 | End: 2021-08-24

## 2018-04-18 RX ORDER — PRENATAL VIT 91/IRON/FOLIC/DHA 28-975-200
COMBINATION PACKAGE (EA) ORAL 2 TIMES DAILY
Qty: 24 G | Refills: 0 | Status: SHIPPED | OUTPATIENT
Start: 2018-04-18 | End: 2018-04-28

## 2018-04-18 NOTE — PROGRESS NOTES
Pt called back after she was discharged to explain she already tried Lamisil after 1 week of clotrimazole, and requested something else. She was instructed to again use clotrimazole cream 1%, but for 4 weeks, as the previous regimen may have been an insufficient duration. She was also instructed to return to the ED if her rash worsened. She expressed frustration that she has been seen in the ED too many times for her rash, and even explained she might obtain a . I again informed her to return if her rash worsened, and instructed her to f/u with her PCP. She hung up.

## 2018-04-18 NOTE — ED TRIAGE NOTES
"Pt states "My feet is swollen and has not skin at the bottom." C/o right foot pain/swelling, pain with weightbearing, tingling, fungus between toes. Denies numbness. Pt put neosporin between toes PTA   "

## 2018-04-18 NOTE — ED PROVIDER NOTES
"Encounter Date: 4/18/2018       History     Chief Complaint   Patient presents with    Foot Pain      my right foot has swell back up. I was giving antibiotics but its not working. I can barely walk on it. It burns with the sock on".     This is a 42-year-old female with history of hypertension who presents for evaluation of rash to the toes of her right foot that started over a month ago.  She was evaluated for the same rash approximately one month ago and treated with clotrimazole cream for one week, which she explains helped briefly and then her rash came back.  She describes the pain as a burning sensation, mostly between the toes, but also notes some dry skin on the top of the foot.  She denies swelling, discharge, injury, fever.           Review of patient's allergies indicates:  No Known Allergies  Past Medical History:   Diagnosis Date    Hypertension      Past Surgical History:   Procedure Laterality Date    HERNIA REPAIR      HYSTERECTOMY      TUBAL LIGATION       No family history on file.  Social History   Substance Use Topics    Smoking status: Never Smoker    Smokeless tobacco: Never Used    Alcohol use No     Review of Systems   Constitutional: Negative for fever.   HENT: Negative for sore throat.    Respiratory: Negative for shortness of breath.    Cardiovascular: Negative for chest pain.   Gastrointestinal: Negative for nausea.   Genitourinary: Negative for dysuria.   Musculoskeletal: Negative for back pain.   Skin: Positive for rash (right foot).   Neurological: Negative for weakness.   Hematological: Does not bruise/bleed easily.       Physical Exam     Initial Vitals [04/18/18 0807]   BP Pulse Resp Temp SpO2   (!) 144/93 75 18 98.3 °F (36.8 °C) 100 %      MAP       110         Physical Exam    Vitals reviewed.  Constitutional: She appears well-developed and well-nourished. She is not diaphoretic. No distress.   HENT:   Head: Normocephalic and atraumatic.   Right Ear: External ear normal. "   Left Ear: External ear normal.   Nose: Nose normal.   Eyes: Conjunctivae are normal. No scleral icterus.   Neck: Normal range of motion. Neck supple.   Cardiovascular: Normal rate, regular rhythm and intact distal pulses.   Pulmonary/Chest: No respiratory distress.   Musculoskeletal: Normal range of motion.   Neurological: She is alert and oriented to person, place, and time.   Skin: Skin is warm and dry. Rash noted. No abscess noted. No erythema. No pallor.   Dry skin noted to dorsal right foot near the fourth and fifth digits.  Scaly vesicular and ulcerated rash to the interdigital spaces and plantar aspect of the fourth and fifth digits.  No erythema, edema, or purulent discharge.         ED Course   Procedures  Labs Reviewed   POCT GLUCOSE - Abnormal; Notable for the following:        Result Value    POCT Glucose 118 (*)     All other components within normal limits             Medical Decision Making:   Initial Assessment:   42-year-old female complains of rash to right foot temporarily relieved with topical antifungals one month ago.  She denies fever, injury, foot swelling.  She presents well-appearing in no distress, afebrile, with vital signs within normal limits.  Right foot with findings consistent with tinea pedis.  No evidence of bacterial infection or deep foot infection.   Differential Diagnosis:   Tinea pedis, dermatitis, cellulitis, other   ED Management:  Low suspicion for bacterial infection. She was treated with clotrimazole cream for 1 week previously with incomplete resolution. Patient treated with topical terbinafine 1% cream and instructed to keep feet dry as much as possible. She was given instructions for PCP follow up and return precautions.                       Clinical Impression:   The encounter diagnosis was Tinea pedis of right foot.                           Vince Romero PA-C  04/18/18 3177

## 2018-06-15 ENCOUNTER — HOSPITAL ENCOUNTER (EMERGENCY)
Facility: HOSPITAL | Age: 43
Discharge: HOME | End: 2018-06-16
Attending: EMERGENCY MEDICINE
Payer: MEDICAID

## 2018-06-15 DIAGNOSIS — L98.8 SKIN MACERATION: ICD-10-CM

## 2018-06-15 DIAGNOSIS — M79.674 TOE PAIN, RIGHT: ICD-10-CM

## 2018-06-15 DIAGNOSIS — B35.3 TINEA PEDIS, RIGHT: Primary | ICD-10-CM

## 2018-06-15 LAB — POCT GLUCOSE: 92 MG/DL (ref 70–110)

## 2018-06-15 PROCEDURE — 99284 EMERGENCY DEPT VISIT MOD MDM: CPT | Mod: 25

## 2018-06-15 PROCEDURE — 82962 GLUCOSE BLOOD TEST: CPT

## 2018-06-16 VITALS
SYSTOLIC BLOOD PRESSURE: 132 MMHG | HEIGHT: 62 IN | DIASTOLIC BLOOD PRESSURE: 82 MMHG | WEIGHT: 129 LBS | TEMPERATURE: 98 F | BODY MASS INDEX: 23.74 KG/M2 | OXYGEN SATURATION: 99 % | RESPIRATION RATE: 18 BRPM | HEART RATE: 82 BPM

## 2018-06-16 RX ORDER — BACITRACIN ZINC 500 UNIT/G
OINTMENT (GRAM) TOPICAL 2 TIMES DAILY
Qty: 30 G | Refills: 0 | OUTPATIENT
Start: 2018-06-16 | End: 2021-08-24

## 2018-06-16 RX ORDER — CLOTRIMAZOLE 1 %
CREAM (GRAM) TOPICAL
Qty: 15 G | Refills: 0 | OUTPATIENT
Start: 2018-06-16 | End: 2021-08-24

## 2018-06-16 NOTE — ED PROVIDER NOTES
Encounter Date: 6/15/2018  SORT MSE:  Pt is a 42 y.o. female who presents for emergent consideration for right foot pain. Pt will be moved to room when one is available, otherwise will wait in waiting room with triage nurse supervision.  Pt arrived by ambulatory. She is not in distress. Orders have not been placed. BARNEY Lang [facility] 06/16/2018 2214      History     Chief Complaint   Patient presents with    Rash     between toes on right foot, complains of pain in bone     42-year-old female with no past medical history presents to emergency department for a 5 day history of atraumatic right 4th toe pain.  Patient has history of nonhealing skin lesions between web spaces of right toes that she has had multiple visits to this ED for as well. Patient denies new symptoms regarding skin lesions, aside from pain to 4th toe. Patient is concerned she may have infection to her right 4th toe today.  Denies fever and numbness.  Patient denies history of diabetes, HIV, and syphilis.  Patient did not have medication prior to arrival.  Patient works doing housecleSynderog and wears closed toed shoes.          Review of patient's allergies indicates:  No Known Allergies  Past Medical History:   Diagnosis Date    Hypertension      Past Surgical History:   Procedure Laterality Date    HERNIA REPAIR      HYSTERECTOMY      TUBAL LIGATION       History reviewed. No pertinent family history.  Social History   Substance Use Topics    Smoking status: Never Smoker    Smokeless tobacco: Never Used    Alcohol use No     Review of Systems   Constitutional: Negative for fever.   HENT: Negative for sore throat.    Respiratory: Negative for cough and shortness of breath.    Cardiovascular: Negative for leg swelling.   Gastrointestinal: Negative for abdominal pain, nausea and vomiting.   Musculoskeletal: Positive for arthralgias. Negative for back pain and neck pain.   Skin: Positive for rash and wound.   Neurological: Negative for  numbness.   All other systems reviewed and are negative.      Physical Exam     Initial Vitals [06/15/18 2214]   BP Pulse Resp Temp SpO2   (!) 159/93 65 18 98 °F (36.7 °C) 99 %      MAP       --         Physical Exam    Nursing note and vitals reviewed.  Constitutional: She appears well-developed and well-nourished. She is not diaphoretic. No distress.   HENT:   Head: Normocephalic and atraumatic.   Nose: Nose normal.   Eyes: Conjunctivae and EOM are normal. Right eye exhibits no discharge. Left eye exhibits no discharge.   Neck: Normal range of motion. No tracheal deviation present. No JVD present.   Cardiovascular: Normal rate, regular rhythm and normal heart sounds. Exam reveals no friction rub.    No murmur heard.  Pulmonary/Chest: Breath sounds normal. No stridor. No respiratory distress. She has no wheezes. She has no rhonchi. She has no rales. She exhibits no tenderness.   Neurological: She is alert and oriented to person, place, and time.   Skin: Skin is warm and dry. No pallor.   Maceration to web spaces on R foot with clear and non-purulent drainage There is surrounding well demarcated hyperpigmented eczematous rash that spares the soles of the feet. Very mild swelling to R 4th toe with very mild TTP to proximal phalanx of 4th toe. No erythema or discernable fluid collections. No visible/palpable foreign body. Full ROM of digits and foot without complication. Ambulatory without limp.          ED Course   Procedures  Labs Reviewed   POCT GLUCOSE          X-Ray Foot Complete Right   Final Result      No acute process.         Electronically signed by: Jono Garcia MD   Date:    06/16/2018   Time:    00:07           Medical Decision Making:   History:   Old Medical Records: I decided to obtain old medical records.  Initial Assessment:   42-year-old female with acute on chronic rash to right foot  Clinical Tests:   Lab Tests: Ordered and Reviewed  Radiological Study: Ordered and Reviewed  ED  Management:  Presentation consistent with poorly managed tinea pedis.  Given new symptom of pain with mild swelling to right 4th digit and multiple visits to this ED for similar symptoms, I will image foot today; no osteomyelitis, acute fracture, or foreign body.  No obvious evidence of cellulitis or paronychia, but given reported new symptom of pain, I will provide antibiotic ointment for potential early bacterial source.  Not hyperglycemic.  RPR negative from past visits in this ED.  HIV negative per outside records.     Sent home with antifungal cream. Advising PCP and dermatology follow up. Strict return precautions discussed. Patient agreeable to plan.   Other:   I have discussed this case with another health care provider.       <> Summary of the Discussion: Discussed with attending              Attending Attestation:     Physician Attestation Statement for NP/PA:   I discussed this assessment and plan of this patient with the NP/PA, but I did not personally examine the patient. The face to face encounter was performed by the NP/PA.                     Clinical Impression:   The primary encounter diagnosis was Tinea pedis, right. Diagnoses of Toe pain, right and Skin maceration were also pertinent to this visit.      Disposition:   Disposition: Discharged  Condition: Stable                        Emmanuel Choi MD  06/16/18 0204       VIVIAN DonovanC  06/16/18 0215

## 2018-06-16 NOTE — ED TRIAGE NOTES
Patient presents to the ED via personal vehicle alone. Patient reports rash between toes on side foots, and pain to right foot. Wounds between toes, draining pus, notes. Denies fever, chills. Denies hx of DM.

## 2020-07-09 ENCOUNTER — HOSPITAL ENCOUNTER (EMERGENCY)
Facility: HOSPITAL | Age: 45
Discharge: HOME | End: 2020-07-09
Attending: EMERGENCY MEDICINE
Payer: MEDICAID

## 2020-07-09 VITALS
BODY MASS INDEX: 23.04 KG/M2 | HEART RATE: 66 BPM | TEMPERATURE: 98 F | WEIGHT: 130 LBS | SYSTOLIC BLOOD PRESSURE: 173 MMHG | HEIGHT: 63 IN | RESPIRATION RATE: 18 BRPM | OXYGEN SATURATION: 99 % | DIASTOLIC BLOOD PRESSURE: 97 MMHG

## 2020-07-09 DIAGNOSIS — R21 RASH: Primary | ICD-10-CM

## 2020-07-09 PROCEDURE — 63600175 PHARM REV CODE 636 W HCPCS: Performed by: PHYSICIAN ASSISTANT

## 2020-07-09 PROCEDURE — 99284 EMERGENCY DEPT VISIT MOD MDM: CPT

## 2020-07-09 RX ORDER — PREDNISONE 20 MG/1
60 TABLET ORAL
Status: COMPLETED | OUTPATIENT
Start: 2020-07-09 | End: 2020-07-09

## 2020-07-09 RX ORDER — HYDROCORTISONE 25 MG/G
OINTMENT TOPICAL 2 TIMES DAILY
Qty: 20 G | Refills: 0 | OUTPATIENT
Start: 2020-07-09 | End: 2021-08-24

## 2020-07-09 RX ORDER — DIPHENHYDRAMINE HCL 25 MG
25 CAPSULE ORAL EVERY 6 HOURS PRN
Qty: 20 CAPSULE | Refills: 0 | OUTPATIENT
Start: 2020-07-09 | End: 2021-08-24

## 2020-07-09 RX ORDER — PREDNISONE 20 MG/1
60 TABLET ORAL DAILY
Qty: 9 TABLET | Refills: 0 | Status: SHIPPED | OUTPATIENT
Start: 2020-07-09 | End: 2020-07-12

## 2020-07-09 RX ADMIN — PREDNISONE 60 MG: 20 TABLET ORAL at 12:07

## 2020-07-09 NOTE — ED PROVIDER NOTES
"Encounter Date: 7/9/2020    SCRIBE #1 NOTE: I, Konrad Mendieta, am scribing for, and in the presence of,  Kayden Vargas PA-C. I have scribed the following portions of the note - Other sections scribed: HPI, ROS.       History     Chief Complaint   Patient presents with    Rash     rash to right hand, itching.     CC: Rash    HPI: This is a 44 y.o. F who has has no pertinent PMHx who presents to the ED complaining of acute pruritic rash to the right hand since last week. Pt's rash is over two tattoos that were done with red ink. She states that one tattoo was done 2 years ago, and the other was done 5 months ago. She has been cleaning the affected area with soap and water, and alcohol. She has been applying Cortisone 10, Mometasone furoate ointment, and Benadryl itch cream. Pt states that Mometasone furoate was prescribed "a long time ago" cannot recall why. 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 She was sent home from work today due to concerns of rash likely being contagious. She is requesting a note to return to work if rash is not contagious. She notes no new detergents, soaps, or lotions.    The history is provided by the patient. No  was used.     Review of patient's allergies indicates:  No Known Allergies  Past Medical History:   Diagnosis Date    Hypertension      Past Surgical History:   Procedure Laterality Date    HERNIA REPAIR      HYSTERECTOMY      TUBAL LIGATION       History reviewed. No pertinent family history.  Social History     Tobacco Use    Smoking status: Never Smoker    Smokeless tobacco: Never Used   Substance Use Topics    Alcohol use: No    Drug use: Yes     Types: Marijuana     Comment: daily     Review of Systems   Constitutional: Negative for fever.   HENT: Negative for sore throat.    Respiratory: Negative for shortness of breath.    Cardiovascular: Negative for chest pain.   Gastrointestinal: Negative " for nausea.   Genitourinary: Negative for dysuria.   Musculoskeletal: Negative for back pain.   Skin: Positive for rash (pruritic rash to the right hand).   Neurological: Negative for weakness.   Hematological: Does not bruise/bleed easily.   All other systems reviewed and are negative.      Physical Exam     Initial Vitals [07/09/20 1041]   BP Pulse Resp Temp SpO2   (!) 164/100 73 16 98.1 °F (36.7 °C) 99 %      MAP       --         Physical Exam    Nursing note and vitals reviewed.  Constitutional: She appears well-developed and well-nourished. She is not diaphoretic. No distress.   HENT:   Head: Normocephalic and atraumatic.   Nose: Nose normal.   Mouth/Throat: Oropharynx is clear and moist. No oropharyngeal exudate.   Eyes: Conjunctivae and EOM are normal. Right eye exhibits no discharge. Left eye exhibits no discharge.   Neck: Normal range of motion. No tracheal deviation present. No JVD present.   Cardiovascular: Normal rate, regular rhythm and normal heart sounds. Exam reveals no friction rub.    No murmur heard.  Pulmonary/Chest: Breath sounds normal. No stridor. No respiratory distress. She has no wheezes. She has no rhonchi. She has no rales. She exhibits no tenderness.   Musculoskeletal: Normal range of motion.   Neurological: She is alert and oriented to person, place, and time.   Skin: Skin is warm and dry. No pallor.   Very well demarcated rash that follows outline of her hand tattoos almost exactly.  Rash does appear to be a very fine urticarial rash.  No tenderness.  No erythema.  No skin sloughing, petechiae, purpura, or drainage.  Full ROM of digits.         ED Course   Procedures  Labs Reviewed - No data to display       Imaging Results    None          Medical Decision Making:   History:   Old Medical Records: I decided to obtain old medical records.      This is an emergent evaluation of a 44 y.o. female presenting to the ED with rash. Denies trauma, pain, fever, SOB, CP, throat swelling, and new  medication use. Denies immunocompromising state. Afebrile and not hypotensive. Non-toxic appearing. No TTP, petechiae, purpura, vesicles, skin sloughing, or mucosal involvement.     Presentation most consistent with contact dermatitis vs. Local histamine reaction. No anaphylaxis or angioedema. Does not appear bacterial, including specifically for cellulitis, necrotizing fasciitis, staphylococcal scalded syndrome, and abscess. Not consistent with viral exanthem, HSV, and HZV. Less consistent with contact dermatitis. I doubt syphilis and SJS.     Discharged home with supportive care. Advising PCP follow up. Strict return precautions discussed. Agreeable to plan.     I discussed with the patient the diagnosis, treatment plan, indications for return to the emergency department, and for expected follow-up. The patient verbalized an understanding. The patient is asked if there are any questions or concerns. We discuss the case, until all issues are addressed to the patients satisfaction. Patient understands and is agreeable to the plan.                                Clinical Impression:       ICD-10-CM ICD-9-CM   1. Rash  R21 782.1             ED Disposition Condition    Discharge Stable        ED Prescriptions     Medication Sig Dispense Start Date End Date Auth. Provider    predniSONE (DELTASONE) 20 MG tablet Take 3 tablets (60 mg total) by mouth once daily. for 3 days 9 tablet 7/9/2020 7/12/2020 Kayden Vargas PA-C    hydrocortisone 2.5 % ointment Apply topically 2 (two) times daily. 20 g 7/9/2020  Kayden Vargas PA-C    diphenhydrAMINE (BENADRYL) 25 mg capsule Take 1 capsule (25 mg total) by mouth every 6 (six) hours as needed for Itching or Allergies. 20 capsule 7/9/2020  Kayden Vargas PA-C        Follow-up Information     Follow up With Specialties Details Why Contact Info    [facility] Schedule an appointment as soon as possible for a visit in 1 day For re-evaluation Stu ZAPATA  [facility] [address]  [phone]      [facility] Emergency Medicine Go to  If symptoms worsen [address] [place] [address] [phone]                      I, Kayden Vargas PA-C, personally performed the services described in this documentation. All medical record entries made by the scribe were at my direction and in my presence.  I have reviewed the chart and agree that the record reflects my personal performance and is accurate and complete.                 Kayden Vargas PA-C  07/09/20 2328

## 2020-07-09 NOTE — Clinical Note
Latonia Zepeda was seen and treated in our emergency department on 7/9/2020.  She may return to work on 07/14/2020.       If you have any questions or concerns, please don't hesitate to call.      Kayden Vargas PA-C

## 2020-07-09 NOTE — ED TRIAGE NOTES
Patient reports itching and irritation to right hand that started last week, redness that started last night. Denies known allergies to any substances.

## 2021-08-24 ENCOUNTER — HOSPITAL ENCOUNTER (EMERGENCY)
Facility: HOSPITAL | Age: 46
Discharge: HOME | End: 2021-08-24
Attending: EMERGENCY MEDICINE
Payer: MEDICAID

## 2021-08-24 VITALS
RESPIRATION RATE: 18 BRPM | HEIGHT: 64 IN | DIASTOLIC BLOOD PRESSURE: 100 MMHG | HEART RATE: 70 BPM | SYSTOLIC BLOOD PRESSURE: 170 MMHG | WEIGHT: 150 LBS | TEMPERATURE: 99 F | OXYGEN SATURATION: 99 % | BODY MASS INDEX: 25.61 KG/M2

## 2021-08-24 DIAGNOSIS — B35.3 TINEA PEDIS OF RIGHT FOOT: Primary | ICD-10-CM

## 2021-08-24 DIAGNOSIS — M79.673 FOOT PAIN: ICD-10-CM

## 2021-08-24 DIAGNOSIS — L08.89 SECONDARY INFECTION OF SKIN: ICD-10-CM

## 2021-08-24 LAB — POCT GLUCOSE: 89 MG/DL (ref 70–110)

## 2021-08-24 PROCEDURE — 25000003 PHARM REV CODE 250: Performed by: PHYSICIAN ASSISTANT

## 2021-08-24 PROCEDURE — 99284 EMERGENCY DEPT VISIT MOD MDM: CPT | Mod: 25

## 2021-08-24 PROCEDURE — 82962 GLUCOSE BLOOD TEST: CPT

## 2021-08-24 RX ORDER — TOLNAFTATE 10 MG/G
CREAM TOPICAL 2 TIMES DAILY
COMMUNITY

## 2021-08-24 RX ORDER — MUPIROCIN 20 MG/G
OINTMENT TOPICAL 3 TIMES DAILY
Qty: 15 G | Refills: 0 | Status: SHIPPED | OUTPATIENT
Start: 2021-08-24 | End: 2021-08-31

## 2021-08-24 RX ORDER — CEPHALEXIN 500 MG/1
500 CAPSULE ORAL 4 TIMES DAILY
Qty: 20 CAPSULE | Refills: 0 | Status: SHIPPED | OUTPATIENT
Start: 2021-08-24 | End: 2021-08-29

## 2021-08-24 RX ORDER — ACETAMINOPHEN 500 MG
500 TABLET ORAL EVERY 4 HOURS PRN
Qty: 20 TABLET | Refills: 0 | Status: SHIPPED | OUTPATIENT
Start: 2021-08-24 | End: 2021-08-29

## 2021-08-24 RX ORDER — IBUPROFEN 600 MG/1
600 TABLET ORAL EVERY 6 HOURS PRN
Qty: 20 TABLET | Refills: 0 | Status: SHIPPED | OUTPATIENT
Start: 2021-08-24 | End: 2021-08-29

## 2021-08-24 RX ORDER — TERBINAFINE HYDROCHLORIDE 250 MG/1
250 TABLET ORAL DAILY
COMMUNITY

## 2021-08-24 RX ORDER — MUPIROCIN 20 MG/G
1 OINTMENT TOPICAL
Status: COMPLETED | OUTPATIENT
Start: 2021-08-24 | End: 2021-08-24

## 2021-08-24 RX ORDER — CEPHALEXIN 250 MG/1
500 CAPSULE ORAL
Status: COMPLETED | OUTPATIENT
Start: 2021-08-24 | End: 2021-08-24

## 2021-08-24 RX ORDER — IBUPROFEN 600 MG/1
600 TABLET ORAL
Status: DISCONTINUED | OUTPATIENT
Start: 2021-08-24 | End: 2021-08-24 | Stop reason: HOSPADM

## 2021-08-24 RX ADMIN — MUPIROCIN 22 G: 20 OINTMENT TOPICAL at 03:08

## 2021-08-24 RX ADMIN — CEPHALEXIN 500 MG: 250 CAPSULE ORAL at 03:08

## 2022-03-25 ENCOUNTER — HOSPITAL ENCOUNTER (EMERGENCY)
Facility: HOSPITAL | Age: 47
Discharge: HOME | End: 2022-03-25
Attending: EMERGENCY MEDICINE
Payer: MEDICAID

## 2022-03-25 VITALS
OXYGEN SATURATION: 100 % | TEMPERATURE: 98 F | HEART RATE: 82 BPM | WEIGHT: 165 LBS | DIASTOLIC BLOOD PRESSURE: 95 MMHG | SYSTOLIC BLOOD PRESSURE: 158 MMHG | RESPIRATION RATE: 19 BRPM | BODY MASS INDEX: 28.32 KG/M2

## 2022-03-25 DIAGNOSIS — L03.112 CELLULITIS OF LEFT AXILLA: ICD-10-CM

## 2022-03-25 DIAGNOSIS — L02.91 ABSCESS: Primary | ICD-10-CM

## 2022-03-25 PROCEDURE — 25000003 PHARM REV CODE 250: Performed by: PHYSICIAN ASSISTANT

## 2022-03-25 PROCEDURE — 10060 I&D ABSCESS SIMPLE/SINGLE: CPT

## 2022-03-25 PROCEDURE — 99284 EMERGENCY DEPT VISIT MOD MDM: CPT | Mod: 25

## 2022-03-25 RX ORDER — DOXYCYCLINE 100 MG/1
100 CAPSULE ORAL EVERY 12 HOURS
Qty: 20 CAPSULE | Refills: 0 | Status: SHIPPED | OUTPATIENT
Start: 2022-03-25 | End: 2022-04-04

## 2022-03-25 RX ORDER — IBUPROFEN 600 MG/1
600 TABLET ORAL EVERY 6 HOURS PRN
Qty: 20 TABLET | Refills: 0 | Status: SHIPPED | OUTPATIENT
Start: 2022-03-25 | End: 2022-03-30

## 2022-03-25 RX ORDER — ACETAMINOPHEN 500 MG
500 TABLET ORAL EVERY 4 HOURS PRN
Qty: 20 TABLET | Refills: 0 | Status: SHIPPED | OUTPATIENT
Start: 2022-03-25 | End: 2022-03-30

## 2022-03-25 RX ORDER — LIDOCAINE HYDROCHLORIDE 10 MG/ML
10 INJECTION INFILTRATION; PERINEURAL
Status: COMPLETED | OUTPATIENT
Start: 2022-03-25 | End: 2022-03-25

## 2022-03-25 RX ORDER — HYDROCHLOROTHIAZIDE 25 MG/1
25 TABLET ORAL DAILY
COMMUNITY

## 2022-03-25 RX ORDER — IBUPROFEN 600 MG/1
600 TABLET ORAL
Status: COMPLETED | OUTPATIENT
Start: 2022-03-25 | End: 2022-03-25

## 2022-03-25 RX ORDER — DOXYCYCLINE HYCLATE 100 MG
100 TABLET ORAL
Status: COMPLETED | OUTPATIENT
Start: 2022-03-25 | End: 2022-03-25

## 2022-03-25 RX ADMIN — IBUPROFEN 600 MG: 600 TABLET ORAL at 08:03

## 2022-03-25 RX ADMIN — LIDOCAINE HYDROCHLORIDE 10 ML: 10 INJECTION, SOLUTION INFILTRATION; PERINEURAL at 07:03

## 2022-03-25 RX ADMIN — DOXYCYCLINE HYCLATE 100 MG: 100 TABLET, COATED ORAL at 08:03

## 2022-03-25 NOTE — ED TRIAGE NOTES
Pt presents to ED c/o large boil to Left underarm that has gotten increasingly larger over the past 3 days. Pt denies N/V/D, chest pain, SOB, fever, chills. Pt is alert, calm, and oriented x4, no acute distress noted.

## 2022-03-25 NOTE — ED PROVIDER NOTES
"Encounter Date: 3/25/2022    SCRIBE #1 NOTE: I, Ayesha Hartley, am scribing for, and in the presence of,  Nellie Bates PA-C. I have scribed the following portions of the note - Other sections scribed: HPI, ROS, PE.       History     Chief Complaint   Patient presents with    Abscess     Pt reporting large boil to left underarm x 3 days.Pt has a hx of HTN.      CC: Abscess    HPI: This is a 46 y.o.female patient, with a PMHx of HTN, presenting to the ED for further evaluation of abscess under left underarm beginning 3 days ago. Patient reports the abscess has gotten larger overtime and denies any associated drainage. Patient reports, " I get them all the time". Patient reports associated pain for be a 7/10. Patient reports taking Tylenol to help alleviate the symptoms. Patient states she uses a Dove deodorant in that area as suggested by her Dermatologist but did not use it today. Patient is scheduled to see her dermatologist on 3/31. Patient reports seeing her dermatologist biweekly to get her Dupixent shot for her eczema. Patient denies taking her daily medications today as prescribed. Patient reports a family history of abscesses and her brother had to get surgery for it. Patient denies any fever, chills, shortness of breath, chest pain, neck pain, back pain, abdominal pain, rash, headaches, congestion, rhinorrhea, cough, sore throat, ear pain, eye pain, blurred vision, nausea, vomiting, diarrhea, dysuria, or any other associated symptoms. No known drug allergies.          Review of patient's allergies indicates:  No Known Allergies  Past Medical History:   Diagnosis Date    Hypertension      Past Surgical History:   Procedure Laterality Date    HERNIA REPAIR      HYSTERECTOMY      TUBAL LIGATION       No family history on file.  Social History     Tobacco Use    Smoking status: Never Smoker    Smokeless tobacco: Never Used   Substance Use Topics    Alcohol use: No    Drug use: Yes     Types: " Marijuana     Comment: daily     Review of Systems   Constitutional: Negative for chills and fever.   HENT: Negative for congestion, ear discharge, ear pain, rhinorrhea, sore throat and trouble swallowing.    Eyes: Negative for visual disturbance.   Respiratory: Negative for cough and shortness of breath.    Cardiovascular: Negative for chest pain and leg swelling.   Gastrointestinal: Negative for abdominal pain, diarrhea, nausea and vomiting.   Genitourinary: Negative for dysuria.   Musculoskeletal: Negative for back pain, neck pain and neck stiffness.   Skin: Negative for color change, rash and wound.        (+) Abscess to left axillary   Neurological: Negative for seizures, syncope, speech difficulty, weakness and headaches.   Psychiatric/Behavioral: Negative for confusion.       Physical Exam     Initial Vitals [03/25/22 0733]   BP Pulse Resp Temp SpO2   120/84 75 18 98.1 °F (36.7 °C) 100 %      MAP       --         Physical Exam    Nursing note and vitals reviewed.  Constitutional: She appears well-developed and well-nourished. No distress.   HENT:   Head: Normocephalic.   Right Ear: External ear normal.   Left Ear: External ear normal.   Eyes: Conjunctivae are normal. Right eye exhibits no discharge. Left eye exhibits no discharge. No scleral icterus.   Neck: No tracheal deviation present.   Pulmonary/Chest: No stridor. No respiratory distress.   Musculoskeletal:         General: Normal range of motion.     Neurological: She is alert.   Skin: Skin is warm and dry. No rash noted. There is erythema (2x2 cm area to left axillary, with fluctuation and tenderness to palpation).   Psychiatric: She has a normal mood and affect. Her behavior is normal. Judgment and thought content normal.         ED Course   I & D - Incision and Drainage    Date/Time: 3/25/2022 8:04 AM  Location procedure was performed: [facility] EMERGENCY DEPARTMENT  Performed by: Nellie Bates PA-C  Authorized by: Court Davalos MD    Pre-operative diagnosis: l axillary abscess  Consent Done: Yes  Consent: Verbal consent obtained.  Type: abscess  Anesthesia: local infiltration    Anesthesia:  Local Anesthetic: lidocaine 1% without epinephrine  Anesthetic total: 5 mL  Description of findings: L axillary abscess   Scalpel size: 11  Incision type: single straight  Complexity: simple  Drainage: bloody and  purulent  Drainage amount: moderate  Wound treatment: incision,  drainage,  expression of material,  deloculation and  wound packed  Packing material: 1/4 in iodoform gauze  Patient tolerance: Patient tolerated the procedure well with no immediate complications        Labs Reviewed - No data to display       Imaging Results    None          Medications   LIDOcaine HCL 10 mg/ml (1%) injection 10 mL (10 mLs Infiltration Given by Other 3/25/22 0756)   ibuprofen tablet 600 mg (600 mg Oral Given 3/25/22 0802)   doxycycline tablet 100 mg (100 mg Oral Given 3/25/22 0802)     Medical Decision Making:   ED Management:  [age] year female history of eczema on Dupixent and history of recurrent abscesses presenting for evaluation of left axillary pain and swelling for the past 4 days.  Patient is afebrile nontoxic appearing in no distress.  Exam above.  Incision drainage performed per procedure note.  Doxycycline prescribed for cellulitis.  Will have patient follow-up with primary care/Dermatology.  Return emergency department worsening symptoms or as needed.          Scribe Attestation:   Scribe #1: I performed the above scribed service and the documentation accurately describes the services I performed. I attest to the accuracy of the note.                 Clinical Impression:   Final diagnoses:  [L02.91] Abscess (Primary)  [L03.112] Cellulitis of left axilla        INellie PA-C , personally performed the services described in this documentation. All medical record entries made by the scribe were at my direction and in my presence. I have  reviewed the chart and agree that the record reflects my personal performance and is accurate and complete.       ED Disposition Condition    Discharge Stable        ED Prescriptions     Medication Sig Dispense Start Date End Date Auth. Provider    ibuprofen (ADVIL,MOTRIN) 600 MG tablet Take 1 tablet (600 mg total) by mouth every 6 (six) hours as needed for Pain. 20 tablet 3/25/2022 3/30/2022 Nellie Bates PA-C    acetaminophen (TYLENOL) 500 MG tablet Take 1 tablet (500 mg total) by mouth every 4 (four) hours as needed. 20 tablet 3/25/2022 3/30/2022 Nellie Bates PA-C    doxycycline (VIBRAMYCIN) 100 MG Cap Take 1 capsule (100 mg total) by mouth every 12 (twelve) hours. for 10 days 20 capsule 3/25/2022 4/4/2022 Nellie Bates PA-C        Follow-up Information     Follow up With Specialties Details Why Contact Info    [facility] Schedule an appointment as soon as possible for a visit in 2 days for follow up [address] [place] [address]  [phone]      [facility] Emergency Medicine Go to  As needed, If symptoms worsen [address]  [facility] [address] [phone]           Nellie Bates PA-C  03/25/22 0907

## 2022-03-25 NOTE — Clinical Note
"Latonia KHOURY "Latonia KHOURY" Katelyn was seen and treated in our emergency department on 3/25/2022.  She may return to work on 03/29/2022.       If you have any questions or concerns, please don't hesitate to call.      Nellie Bates PA-C"

## 2022-03-25 NOTE — DISCHARGE INSTRUCTIONS

## 2022-12-05 ENCOUNTER — HOSPITAL ENCOUNTER (EMERGENCY)
Facility: HOSPITAL | Age: 47
Discharge: HOME | End: 2022-12-05
Attending: EMERGENCY MEDICINE
Payer: MEDICAID

## 2022-12-05 VITALS
BODY MASS INDEX: 27.31 KG/M2 | HEART RATE: 90 BPM | RESPIRATION RATE: 20 BRPM | OXYGEN SATURATION: 98 % | DIASTOLIC BLOOD PRESSURE: 90 MMHG | WEIGHT: 160 LBS | SYSTOLIC BLOOD PRESSURE: 148 MMHG | TEMPERATURE: 98 F | HEIGHT: 64 IN

## 2022-12-05 DIAGNOSIS — U07.1 COVID-19: Primary | ICD-10-CM

## 2022-12-05 DIAGNOSIS — R31.9 HEMATURIA, UNSPECIFIED TYPE: ICD-10-CM

## 2022-12-05 DIAGNOSIS — N20.0 LEFT NEPHROLITHIASIS: ICD-10-CM

## 2022-12-05 LAB
ALBUMIN SERPL BCP-MCNC: 4.4 G/DL (ref 3.5–5.2)
ALP SERPL-CCNC: 63 U/L (ref 55–135)
ALT SERPL W/O P-5'-P-CCNC: 16 U/L (ref 10–44)
ANION GAP SERPL CALC-SCNC: 13 MMOL/L (ref 8–16)
AST SERPL-CCNC: 23 U/L (ref 10–40)
BACTERIA #/AREA URNS HPF: ABNORMAL /HPF
BASOPHILS # BLD AUTO: 0.02 K/UL (ref 0–0.2)
BASOPHILS NFR BLD: 0.4 % (ref 0–1.9)
BILIRUB SERPL-MCNC: 0.3 MG/DL (ref 0.1–1)
BILIRUB UR QL STRIP: NEGATIVE
BUN SERPL-MCNC: 10 MG/DL (ref 6–20)
CALCIUM SERPL-MCNC: 10 MG/DL (ref 8.7–10.5)
CHLORIDE SERPL-SCNC: 102 MMOL/L (ref 95–110)
CLARITY UR: ABNORMAL
CO2 SERPL-SCNC: 23 MMOL/L (ref 23–29)
COLOR UR: YELLOW
CREAT SERPL-MCNC: 0.8 MG/DL (ref 0.5–1.4)
CTP QC/QA: YES
DIFFERENTIAL METHOD: ABNORMAL
EOSINOPHIL # BLD AUTO: 0.1 K/UL (ref 0–0.5)
EOSINOPHIL NFR BLD: 0.9 % (ref 0–8)
ERYTHROCYTE [DISTWIDTH] IN BLOOD BY AUTOMATED COUNT: 12.1 % (ref 11.5–14.5)
EST. GFR  (NO RACE VARIABLE): >60 ML/MIN/1.73 M^2
GLUCOSE SERPL-MCNC: 100 MG/DL (ref 70–110)
GLUCOSE UR QL STRIP: ABNORMAL
HCT VFR BLD AUTO: 37.8 % (ref 37–48.5)
HGB BLD-MCNC: 12.9 G/DL (ref 12–16)
HGB UR QL STRIP: ABNORMAL
HYALINE CASTS #/AREA URNS LPF: 0 /LPF
IMM GRANULOCYTES # BLD AUTO: 0.01 K/UL (ref 0–0.04)
IMM GRANULOCYTES NFR BLD AUTO: 0.2 % (ref 0–0.5)
KETONES UR QL STRIP: ABNORMAL
LEUKOCYTE ESTERASE UR QL STRIP: NEGATIVE
LYMPHOCYTES # BLD AUTO: 1.6 K/UL (ref 1–4.8)
LYMPHOCYTES NFR BLD: 29.6 % (ref 18–48)
MCH RBC QN AUTO: 31.8 PG (ref 27–31)
MCHC RBC AUTO-ENTMCNC: 34.1 G/DL (ref 32–36)
MCV RBC AUTO: 93 FL (ref 82–98)
MICROSCOPIC COMMENT: ABNORMAL
MONOCYTES # BLD AUTO: 1 K/UL (ref 0.3–1)
MONOCYTES NFR BLD: 18.5 % (ref 4–15)
NEUTROPHILS # BLD AUTO: 2.7 K/UL (ref 1.8–7.7)
NEUTROPHILS NFR BLD: 50.4 % (ref 38–73)
NITRITE UR QL STRIP: NEGATIVE
NRBC BLD-RTO: 0 /100 WBC
PH UR STRIP: 6 [PH] (ref 5–8)
PLATELET # BLD AUTO: 360 K/UL (ref 150–450)
PMV BLD AUTO: 8.9 FL (ref 9.2–12.9)
POCT GLUCOSE: 93 MG/DL (ref 70–110)
POTASSIUM SERPL-SCNC: 4.5 MMOL/L (ref 3.5–5.1)
PROT SERPL-MCNC: 9 G/DL (ref 6–8.4)
PROT UR QL STRIP: ABNORMAL
RBC # BLD AUTO: 4.06 M/UL (ref 4–5.4)
RBC #/AREA URNS HPF: >100 /HPF (ref 0–4)
SARS-COV-2 RDRP RESP QL NAA+PROBE: POSITIVE
SODIUM SERPL-SCNC: 138 MMOL/L (ref 136–145)
SP GR UR STRIP: >1.03 (ref 1–1.03)
SQUAMOUS #/AREA URNS HPF: 8 /HPF
URN SPEC COLLECT METH UR: ABNORMAL
UROBILINOGEN UR STRIP-ACNC: ABNORMAL EU/DL
WBC # BLD AUTO: 5.41 K/UL (ref 3.9–12.7)
WBC #/AREA URNS HPF: 2 /HPF (ref 0–5)
WBC CLUMPS URNS QL MICRO: ABNORMAL

## 2022-12-05 PROCEDURE — 87635 SARS-COV-2 COVID-19 AMP PRB: CPT | Performed by: NURSE PRACTITIONER

## 2022-12-05 PROCEDURE — 81000 URINALYSIS NONAUTO W/SCOPE: CPT | Performed by: NURSE PRACTITIONER

## 2022-12-05 PROCEDURE — 99284 EMERGENCY DEPT VISIT MOD MDM: CPT | Mod: 25

## 2022-12-05 PROCEDURE — 82962 GLUCOSE BLOOD TEST: CPT

## 2022-12-05 PROCEDURE — 25000003 PHARM REV CODE 250: Performed by: NURSE PRACTITIONER

## 2022-12-05 PROCEDURE — 80053 COMPREHEN METABOLIC PANEL: CPT | Performed by: NURSE PRACTITIONER

## 2022-12-05 PROCEDURE — 85025 COMPLETE CBC W/AUTO DIFF WBC: CPT | Performed by: NURSE PRACTITIONER

## 2022-12-05 PROCEDURE — 36000 PLACE NEEDLE IN VEIN: CPT

## 2022-12-05 RX ORDER — ACETAMINOPHEN 500 MG
1000 TABLET ORAL EVERY 8 HOURS PRN
Qty: 20 TABLET | Refills: 0 | Status: SHIPPED | OUTPATIENT
Start: 2022-12-05

## 2022-12-05 RX ORDER — BENZONATATE 100 MG/1
100 CAPSULE ORAL 3 TIMES DAILY PRN
Qty: 20 CAPSULE | Refills: 0 | Status: SHIPPED | OUTPATIENT
Start: 2022-12-05 | End: 2022-12-15

## 2022-12-05 RX ORDER — ALBUTEROL SULFATE 90 UG/1
1-2 AEROSOL, METERED RESPIRATORY (INHALATION) EVERY 6 HOURS PRN
Qty: 8 G | Refills: 0 | Status: SHIPPED | OUTPATIENT
Start: 2022-12-05 | End: 2023-12-05

## 2022-12-05 RX ORDER — DUPILUMAB 300 MG/2ML
INJECTION, SOLUTION SUBCUTANEOUS
COMMUNITY
Start: 2022-11-07

## 2022-12-05 RX ORDER — IBUPROFEN 600 MG/1
600 TABLET ORAL EVERY 6 HOURS PRN
Qty: 20 TABLET | Refills: 0 | Status: SHIPPED | OUTPATIENT
Start: 2022-12-05

## 2022-12-05 RX ORDER — IBUPROFEN 600 MG/1
600 TABLET ORAL
Status: COMPLETED | OUTPATIENT
Start: 2022-12-05 | End: 2022-12-05

## 2022-12-05 RX ORDER — ADALIMUMAB 80MG/0.8ML
KIT SUBCUTANEOUS
COMMUNITY
Start: 2022-11-21

## 2022-12-05 RX ADMIN — IBUPROFEN 600 MG: 600 TABLET ORAL at 08:12

## 2022-12-05 NOTE — Clinical Note
"Latonia KHOURY"Latonia Zepeda was seen and treated in our emergency department on 12/5/2022.     COVID-19 is present in our communities across the state. There is limited testing for COVID at this time, so not all patients can be tested. In this situation, your employee meets the following criteria:    Latonia Zepeda has met the criteria for COVID-19 testing and has a POSITIVE result. She can return to work once they are asymptomatic for 24 hours without the use of fever reducing medications AND at least five days from the first positive result. A mask is recommended for 5 days post quarantine.     If you have any questions or concerns, or if I can be of further assistance, please do not hesitate to contact me.    Sincerely,              RN"

## 2022-12-05 NOTE — DISCHARGE INSTRUCTIONS
Thank you for coming to our Emergency Department today. It is important to remember that some problems or medical conditions are difficult to diagnose and may not be found during your Emergency Department visit.     Be sure to follow up with your primary care doctor and review all labs/imaging/tests that were performed during your ER visit with them. Some labs/tests may be outside of the normal range and require non-emergent follow-up and further investigation to help diagnose/exclude/prevent complications or other potentially serious medical conditions that were not addressed during your ER visit.    If you do not have a primary care doctor, you may contact the one listed on your discharge paperwork or you may also call the [facility] Appointment Desk at [phone] to schedule an appointment and establish care with one. It is important to your health that you have a primary care doctor.    Please take all medications as directed. All medications may potentially have side-effects and it is impossible to predict which medications may give you side-effects or what side-effects (if any) they will give you.. If you feel that you are having a negative effect or side-effect of any medication you should immediately stop taking them and seek medical attention. If you feel that you are having a life-threatening reaction call 911.    Return to the ER with any questions/concerns, new/concerning symptoms, worsening or failure to improve.     Do not drive, swim, climb to height, take a bath, operate heavy machinery, drink alcohol or take potentially sedating medications, sign any legal documents or make any important decisions for 24 hours if you have received any pain medications, sedatives or mood altering drugs during your ER visit or within 24 hours of taking them if they have been prescribed to you.     You can find additional resources for Dentists, hearing aids, durable medical equipment, low cost pharmacies and  other resources at https://[facility].org    BELOW THIS LINE ONLY APPLIES IF YOU HAVE A COVID TEST PENDING OR IF YOU HAVE BEEN DIAGNOSED WITH COVID:  Please access [facility] to review the results of your test. Until the results of your COVID test return, you should isolate yourself so as not to potentially spread illness to others.   If your COVID test returns positive, you should isolate yourself so as not to spread illness to others. After five full days, if you are feeling better and you have not had fever for 24 hours, you can return to your typical daily activities, but you must wear a mask around others for an additional 5 days.   If your COVID test returns negative and you are either unvaccinated or more than six months out from your two-dose vaccine and are not yet boosted, you should still quarantine for 5 full days followed by strict mask use for an additional 5 full days.   If your COVID test returns negative and you have received your 2-dose initial vaccine as well as a booster, you should continue strict mask use for 10 full days after the exposure.  For all those exposed, best practice includes a test at day 5 after the exposure. This can be a home test or a test through one of the many testing centers throughout our community.   Masking is always advised to limit the spread of COVID. Cdc.gov is an excellent site to obtain the latest up to date recommendations regarding COVID and COVID testing.     CDC Testing and Quarantine Guidelines for patients with exposure to a known-positive COVID-19 person:  A close exposure is defined as anyone who has had an exposure (masked or unmasked) to a known COVID -19 positive person within 6 feet of someone for a cumulative total of 15 minutes or more over a 24-hour period.   Vaccinated and/or if you recently had a positive covid test within 90 days do NOT need to quarantine after contact with someone who had COVID-19 unless you develop symptoms.   Fully vaccinated  people who have not had a positive test within 90 days, should get tested 3-5 days after their exposure, even if they don't have symptoms and wear a mask indoors in public for 14 days following exposure or until their test result is negative.      Unvaccinated and/or NOT had a positive test within 90 days and meet close exposure  You are required by CDC guidelines to quarantine for at least 5 days from time of exposure followed by 5 days of strict masking. It is recommended, but not required to test after 5 days, unless you develop symptoms, in which case you should test at that time.  If you get tested after 5 days and your test is positive, your 5 day period of isolation starts the day of the positive test.    If your exposure does not meet the above definition, you can return to your normal daily activities to include social distancing, wearing a mask and frequent handwashing.      Here is a link to guidance from the CDC:  https://www.cdc.gov/media/releases/2021/s1227-isolation-quarantine-guidance.html      [place] Dept Of Health Testing Sites:  https://ldh.la.gov/page/3934      [facility] website with testing locations and guidance:  [email or web address]

## 2022-12-05 NOTE — ED PROVIDER NOTES
"Encounter Date: 12/5/2022    SCRIBE #1 NOTE: I, Sarah Pathak, am scribing for, and in the presence of,  Ingrid Crowder NP. I have scribed the following portions of the note - Other sections scribed: HPI, ROS.     History     Chief Complaint   Patient presents with    Back Pain     Pt reports lower back pain, generalized body aches and L ear pain x 1 day. Reports having a fever and taking tylenol @1241am     This 47 y.o female, with a medical history of Hypertension, presents to the ED c/o generalized body aches that began upon awaking yesterday. Pt reports also experiencing generalized weakness, a headache, left ear pain ("like a strike of lightening"), low back pain, and nasal congestion (at night). She notes taking an OTC cold and Flu medication for treatment. Pt states that she also began to experience a subjective fever and sweats last night prompting her to take Tylenol for treatment with improvement. She reports, however, that the other symptoms have since persisted. No known sick contacts. Of note, pt is a smoker (marijuana). She denies cough, sore throat, dental pain, dysuria, hematuria, or malodorous urine. No other associated symptoms.    The history is provided by the patient.   Review of patient's allergies indicates:  No Known Allergies  Past Medical History:   Diagnosis Date    Hypertension      Past Surgical History:   Procedure Laterality Date    HERNIA REPAIR      HYSTERECTOMY      TUBAL LIGATION       History reviewed. No pertinent family history.  Social History     Tobacco Use    Smoking status: Never    Smokeless tobacco: Never   Substance Use Topics    Alcohol use: No    Drug use: Yes     Types: Marijuana     Comment: daily     Review of Systems   Constitutional:  Positive for diaphoresis (improved) and fever (improved).   HENT:  Positive for congestion (nasal) and ear pain (left). Negative for dental problem and sore throat.    Respiratory:  Negative for cough and shortness of breath.  "   Cardiovascular:  Negative for chest pain.   Gastrointestinal:  Negative for nausea.   Genitourinary:  Negative for dysuria, flank pain and hematuria.   Musculoskeletal:  Positive for back pain and myalgias (generalized).   Skin:  Negative for rash.   Neurological:  Positive for weakness (generalized) and headaches.     Physical Exam     Initial Vitals [12/05/22 0719]   BP Pulse Resp Temp SpO2   (!) 150/90 97 18 98.6 °F (37 °C) 98 %      MAP       --         Physical Exam    Constitutional: She appears well-developed and well-nourished. She is not diaphoretic. No distress.   HENT:   Head: Normocephalic and atraumatic.   Right Ear: External ear normal.   Left Ear: External ear normal.   Nose: Nose normal.   Mouth/Throat: Oropharynx is clear and moist. No oropharyngeal exudate.   Eyes: Conjunctivae and EOM are normal. Pupils are equal, round, and reactive to light.   Neck:   Normal range of motion.  Cardiovascular:  Normal rate, regular rhythm, normal heart sounds and intact distal pulses.           Pulmonary/Chest: Breath sounds normal. No respiratory distress.   Abdominal: Abdomen is soft. Bowel sounds are normal. There is no abdominal tenderness.   Musculoskeletal:         General: Normal range of motion.      Cervical back: Normal range of motion.     Neurological: She is alert and oriented to person, place, and time.   Skin: Skin is warm and dry.   Psychiatric: She has a normal mood and affect. Her behavior is normal.       ED Course   Procedures  Labs Reviewed   URINALYSIS, REFLEX TO URINE CULTURE - Abnormal; Notable for the following components:       Result Value    Appearance, UA Hazy (*)     Specific Gravity, UA >1.030 (*)     Protein, UA 2+ (*)     Glucose, UA Trace (*)     Ketones, UA 2+ (*)     Occult Blood UA 3+ (*)     Urobilinogen, UA 2.0-3.0 (*)     All other components within normal limits    Narrative:     Specimen Source->Urine   URINALYSIS MICROSCOPIC - Abnormal; Notable for the following  components:    RBC, UA >100 (*)     All other components within normal limits    Narrative:     Specimen Source->Urine   CBC W/ AUTO DIFFERENTIAL - Abnormal; Notable for the following components:    MCH 31.8 (*)     MPV 8.9 (*)     Mono % 18.5 (*)     All other components within normal limits   COMPREHENSIVE METABOLIC PANEL - Abnormal; Notable for the following components:    Total Protein 9.0 (*)     All other components within normal limits   SARS-COV-2 RDRP GENE - Abnormal; Notable for the following components:    POC Rapid COVID Positive (*)     All other components within normal limits   POCT INFLUENZA A/B MOLECULAR   POCT GLUCOSE   POCT GLUCOSE MONITORING CONTINUOUS          Imaging Results              CT Renal Stone Study ABD Pelvis WO (Final result)  Result time 12/05/22 13:15:33      Final result by Jamil Hernández MD (12/05/22 13:15:33)                   Impression:      Left renal suspected solitary punctate nonobstructing nephrolith.    Otherwise no acute process seen within the abdomen or pelvis on this noncontrast study.    Suspected gallbladder sludge without acute cholecystitis.    Remote cholecystectomy.    Few additional findings as above.      Electronically signed by: Jamil Hernández MD  Date:    12/05/2022  Time:    13:15               Narrative:    EXAMINATION:  CT RENAL STONE STUDY ABD PELVIS WO    CLINICAL HISTORY:  Flank pain, kidney stone suspected;    TECHNIQUE:  Low dose axial images, sagittal and coronal reformations were obtained from the lung bases to the pubic symphysis.  Contrast was not administered.    COMPARISON:  Pelvic ultrasound 05/14/2017    FINDINGS:  Imaged lung bases are clear.  Base of the heart is within normal limits.    Gallbladder is normally distended with intraluminal subtle layering hyperattenuation suggesting sludge without evidence of acute cholecystitis.  Noncontrast appearance of the liver, pancreas, spleen, stomach, duodenum and bilateral adrenal glands are  within normal limits.  No biliary ductal dilatation.  Suspected tiny accessory splenule anterior to the spleen.    Bilateral kidneys are normal in size, shape and location.  No hydronephrosis or significant perinephric stranding.  Punctate caliceal hyperattenuating focus at the left renal interpolar region suggesting a nonobstructing nephrolith.  No radiodense calculus definitively seen within the right collecting system, left ureter or urinary bladder.  Ureters are nondilated.  Urinary bladder is suboptimally distended.  Uterus not identified and likely surgically absent.  No adnexal mass.  No significant free fluid in the pelvis.  Pelvic phleboliths noted.    Appendix and terminal ileum are within normal limits.  Mild amount of scattered fecal material throughout the colon.  No evidence of bowel obstruction or acute inflammation.  No pneumatosis or portal venous gas.    No ascites, free air or lymphadenopathy by CT criteria.  Minimal scattered calcific atherosclerosis of the aorta which otherwise tapers normally.    Umbilical piercing jewelry noted with associated streak artifact.  Suspected tiny fat containing umbilical hernia.    Osseous structures appear intact.                                       Medications   ibuprofen tablet 600 mg (600 mg Oral Given 12/5/22 0835)     Medical Decision Making:   ED Management:  47-year-old female presenting to the ED with URI symptoms, low back pain.  COVID is positive.  Flu negative.  No hypoxia or respiratory distress.  She is saturating 98% on room air.  Discussed treatment with Paxlovid.  Patient would like a prescription.  Given handout on drug interactions as well as possible side effects.  Patient is also complaining of low back pain.  Hematuria noted on urinalysis without convincing infection.  CT scan negative for obstructive uropathy.  There is left renal suspected solitary punctate nonobstructing nephrolith.  Will discharge home with instructions to follow up  with Urology.  Return precautions discussed with patient who verbalized understanding.    Based on my clinical evaluation, I do not appreciate any immediate, emergent, or life threatening condition or etiology that warrants additional workup today.  I feel the patient can be discharged with close follow-up care.         Scribe Attestation:   Scribe #1: I performed the above scribed service and the documentation accurately describes the services I performed. I attest to the accuracy of the note.      ED Course as of 12/05/22 1357   Mon Dec 05, 2022   0902 SARS-CoV-2 RNA, Amplification, Qual(!): Positive [MT]      ED Course User Index  [MT] Ingrid Crowder NP               IIRAIDA, personally performed the services described in this documentation. All medical record entries made by the scribe were at my direction and in my presence. I have reviewed the chart and agree that the record reflects my personal performance and is accurate and complete.   Clinical Impression:   Final diagnoses:  [N20.0] Left nephrolithiasis  [U07.1] COVID-19 (Primary)  [R31.9] Hematuria, unspecified type      ED Disposition Condition    Discharge Stable          ED Prescriptions       Medication Sig Dispense Start Date End Date Auth. Provider    nirmatrelvir-ritonavir 300 mg (150 mg x 2)-100 mg copackaged tablets (EUA) Take 3 tablets by mouth 2 (two) times daily for 5 days. Each dose contains 2 nirmatrelvir (pink tablets) and 1 ritonavir (white tablet). Take all 3 tablets together 30 tablet 12/5/2022 12/10/2022 Ingrid Crowder NP    ibuprofen (ADVIL,MOTRIN) 600 MG tablet Take 1 tablet (600 mg total) by mouth every 6 (six) hours as needed for Pain. 20 tablet 12/5/2022 -- Ingrid Crowder NP    acetaminophen (TYLENOL) 500 MG tablet Take 2 tablets (1,000 mg total) by mouth every 8 (eight) hours as needed for Pain or Temperature greater than (100.4). 20 tablet 12/5/2022 -- Ingrid Crowder NP    benzonatate (TESSALON) 100 MG capsule  Take 1 capsule (100 mg total) by mouth 3 (three) times daily as needed for Cough. 20 capsule 12/5/2022 12/15/2022 Ingrid Crowder NP    albuterol (PROVENTIL/VENTOLIN HFA) 90 mcg/actuation inhaler Inhale 1-2 puffs into the lungs every 6 (six) hours as needed for Wheezing. Rescue 8 g 12/5/2022 12/5/2023 Ingrid Crowder NP          Follow-up Information       Follow up With Specialties Details Why Contact Info    [facility] Schedule an appointment as soon as possible for a visit  For follow-up [facility] [address]  [phone]      [facility] Emergency Medicine Go to  If symptoms worsen [address]  [facility] [address] [phone]             Ingrid Crowder NP  12/05/22 3161

## 2023-01-13 ENCOUNTER — OFFICE VISIT (OUTPATIENT)
Dept: UROLOGY | Facility: CLINIC | Age: 48
End: 2023-01-13
Payer: MEDICAID

## 2023-01-13 VITALS — WEIGHT: 167.56 LBS | BODY MASS INDEX: 28.76 KG/M2

## 2023-01-13 DIAGNOSIS — N20.0 RENAL STONE: ICD-10-CM

## 2023-01-13 DIAGNOSIS — R31.29 MICROSCOPIC HEMATURIA: Primary | ICD-10-CM

## 2023-01-13 PROCEDURE — 3008F PR BODY MASS INDEX (BMI) DOCUMENTED: ICD-10-PCS | Mod: CPTII,,, | Performed by: UROLOGY

## 2023-01-13 PROCEDURE — 1159F MED LIST DOCD IN RCRD: CPT | Mod: CPTII,,, | Performed by: UROLOGY

## 2023-01-13 PROCEDURE — 99213 OFFICE O/P EST LOW 20 MIN: CPT | Mod: PBBFAC | Performed by: UROLOGY

## 2023-01-13 PROCEDURE — 99999 PR PBB SHADOW E&M-EST. PATIENT-LVL III: CPT | Mod: PBBFAC,,, | Performed by: UROLOGY

## 2023-01-13 PROCEDURE — 99999 PR PBB SHADOW E&M-EST. PATIENT-LVL III: ICD-10-PCS | Mod: PBBFAC,,, | Performed by: UROLOGY

## 2023-01-13 PROCEDURE — 3008F BODY MASS INDEX DOCD: CPT | Mod: CPTII,,, | Performed by: UROLOGY

## 2023-01-13 PROCEDURE — 99204 OFFICE O/P NEW MOD 45 MIN: CPT | Mod: S$PBB,,, | Performed by: UROLOGY

## 2023-01-13 PROCEDURE — 99204 PR OFFICE/OUTPT VISIT, NEW, LEVL IV, 45-59 MIN: ICD-10-PCS | Mod: S$PBB,,, | Performed by: UROLOGY

## 2023-01-13 PROCEDURE — 1159F PR MEDICATION LIST DOCUMENTED IN MEDICAL RECORD: ICD-10-PCS | Mod: CPTII,,, | Performed by: UROLOGY

## 2023-01-13 NOTE — PROGRESS NOTES
[facility]   Clinic Note    SUBJECTIVE:     Chief Complaint: Microscopic Hematuria    Referral from: Ingrid Crowder NP.    History of Present Illness:  Latonia Zepeda is a 47 y.o. female who presents to clinic for evaluation of Microscopic Hematuria.    Patient here for microhematuria.  She presented to the emergency department with abdominal pain and was found to be positive for COVID.  She had a UA performed which revealed greater than 100 RBCs.  She denies any gross hematuria.  She did not have a urinary tract infection at that time.  She had a CT scan performed which revealed a punctate left sided stone which does not appear to be within the collecting system.  She was referred here for evaluation.    Patient endorses no additional complaints at this time.    Past Medical History:   Diagnosis Date    Hypertension        Past Surgical History:   Procedure Laterality Date    HERNIA REPAIR      HYSTERECTOMY      TUBAL LIGATION         No family history on file.    Social History     Tobacco Use    Smoking status: Never    Smokeless tobacco: Never   Substance Use Topics    Alcohol use: No    Drug use: Yes     Types: Marijuana     Comment: daily       Current Outpatient Medications on File Prior to Visit   Medication Sig Dispense Refill    albuterol (PROVENTIL/VENTOLIN HFA) 90 mcg/actuation inhaler Inhale 1-2 puffs into the lungs every 6 (six) hours as needed for Wheezing. Rescue 8 g 0    DUPIXENT  mg/2 mL PnIj SMARTSI Milligram(s) SUB-Q Every 2 Weeks      HUMIRA,CF, PEN 80 mg/0.8 mL PnKt Inject into the skin.      hydroCHLOROthiazide (HYDRODIURIL) 25 MG tablet Take 25 mg by mouth once daily.      acetaminophen (TYLENOL) 500 MG tablet Take 2 tablets (1,000 mg total) by mouth every 8 (eight) hours as needed for Pain or Temperature greater than (100.4). 20 tablet 0    ibuprofen (ADVIL,MOTRIN) 600 MG tablet Take 1 tablet (600 mg total) by mouth every 6 (six) hours as needed for Pain. 20 tablet 0  "   terbinafine HCL (LAMISIL) 250 mg tablet Take 250 mg by mouth once daily.      tolnaftate (TINACTIN) 1 % cream Apply topically 2 (two) times daily.      triamcinolone acetonide 0.1% (KENALOG) 0.1 % cream Apply topically 2 (two) times daily. 45 g 0    [DISCONTINUED] clotrimazole (LOTRIMIN) 1 % cream Apply to affected area 2 times daily 15 g 0    [DISCONTINUED] hydrocortisone 2.5 % ointment Apply topically 2 (two) times daily. 20 g 0     No current facility-administered medications on file prior to visit.       Review of patient's allergies indicates:  No Known Allergies    Review of Systems:  A review of 10+ systems was conducted with pertinent positive and negative findings documented in HPI with all other systems reviewed and negative.    OBJECTIVE:     Estimated body mass index is 28.76 kg/m² as calculated from the following:    Height as of 12/5/22: 5' 4" (1.626 m).    Weight as of this encounter: 76 kg (167 lb 8.8 oz).    Vital Signs (Most Recent)  There were no vitals filed for this visit.    Physical Exam:  GENERAL: patient sitting comfortably  HEENT: normocephalic  NECK: supple, no JVD  PULM: normal chest rise, no increased WOB  HEART: non-diaphoretic  ABDO: soft, nondistended, nontender  BACK: no CVA tenderness bilaterally  SKIN: warm, dry, well perfused  EXT: no bruising or edema  NEURO: grossly normal with no focal deficits  PSYCH: appropriate mood and affect    Genitourinary Exam:  deferred    Lab Results   Component Value Date    BUN 10 12/05/2022    CREATININE 0.8 12/05/2022    WBC 5.41 12/05/2022    HGB 12.9 12/05/2022    HCT 37.8 12/05/2022     12/05/2022    AST 23 12/05/2022    ALT 16 12/05/2022    ALKPHOS 63 12/05/2022    ALBUMIN 4.4 12/05/2022        PSA:  No results found for: PSA, PSADIAG, PSATOTAL, PSAFREE    Imaging:  All relevant imaging studies have been reviewed personally by me.    Results for orders placed or performed during the hospital encounter of 12/05/22 (from the past 2160 " hour(s))   CT Renal Stone Study ABD Pelvis WO    Impression    Left renal suspected solitary punctate nonobstructing nephrolith.    Otherwise no acute process seen within the abdomen or pelvis on this noncontrast study.    Suspected gallbladder sludge without acute cholecystitis.    Remote cholecystectomy.    Few additional findings as above.      Electronically signed by: Jamil Hernández MD  Date:    12/05/2022  Time:    13:15     No results found for this or any previous visit (from the past 2160 hour(s)).      ASSESSMENT     1. Microscopic hematuria    2. Renal stone        PLAN:     Microscopic Hematuria    - She has microscopic hematuria. I counseled the patient on the AUA Guidelines for a microscopic hematuria workup. We discussed the possible etiologies and that the risk of malignancy is relatively low.    - We discussed the risk stratification for microhematuria. For low risk patients, there is a shared decision making process between repeating UA in 6 months vs proceeding with cystoscopy and renal US, and it was discussed that if repeat UA shows blood, will move on to cystoscopy and upper tract imaging. Intermediate risk patients will undergo cystoscopy and renal US. High risk patients are advised for cystoscopy with CT urogram, MR urogram, or retrograde RPG with non-contrast CT.     - She is high risk.     - Plan for flexible cystoscopy and CT Urogram.    2. Non-obstructing punctate left renal stone    - punctate, nonobstructing, no intervention required    Miller Moscoso MD  Urology  [facility]    Disclaimer: This note has been generated using voice-recognition software. There may be typographical errors that have been missed during proof-reading.

## 2023-01-17 DIAGNOSIS — R31.29 MICROSCOPIC HEMATURIA: Primary | ICD-10-CM

## 2023-01-24 ENCOUNTER — HOSPITAL ENCOUNTER (OUTPATIENT)
Dept: RADIOLOGY | Facility: HOSPITAL | Age: 48
Discharge: HOME | End: 2023-01-24
Attending: UROLOGY
Payer: MEDICAID

## 2023-01-24 DIAGNOSIS — R31.29 MICROSCOPIC HEMATURIA: ICD-10-CM

## 2023-01-24 PROCEDURE — 74178 CT UROGRAM ABD PELVIS W WO: ICD-10-PCS | Mod: 26,,, | Performed by: RADIOLOGY

## 2023-01-24 PROCEDURE — 74178 CT ABD&PLV WO CNTR FLWD CNTR: CPT | Mod: TC

## 2023-01-24 PROCEDURE — 74178 CT ABD&PLV WO CNTR FLWD CNTR: CPT | Mod: 26,,, | Performed by: RADIOLOGY

## 2023-01-24 PROCEDURE — 25500020 PHARM REV CODE 255: Performed by: UROLOGY

## 2023-01-24 RX ADMIN — IOHEXOL 125 ML: 350 INJECTION, SOLUTION INTRAVENOUS at 11:01

## 2023-02-23 ENCOUNTER — PROCEDURE VISIT (OUTPATIENT)
Dept: UROLOGY | Facility: CLINIC | Age: 48
End: 2023-02-23
Payer: MEDICAID

## 2023-02-23 VITALS — BODY MASS INDEX: 28.36 KG/M2 | WEIGHT: 165.25 LBS

## 2023-02-23 DIAGNOSIS — R31.29 MICROSCOPIC HEMATURIA: ICD-10-CM

## 2023-02-23 DIAGNOSIS — L73.9 FOLLICULITIS: Primary | ICD-10-CM

## 2023-02-23 DIAGNOSIS — R31.29 MICROSCOPIC HEMATURIA: Primary | ICD-10-CM

## 2023-02-23 DIAGNOSIS — N30.90 CYSTITIS: ICD-10-CM

## 2023-02-23 PROCEDURE — 99214 PR OFFICE/OUTPT VISIT, EST, LEVL IV, 30-39 MIN: ICD-10-PCS | Mod: S$PBB,,, | Performed by: UROLOGY

## 2023-02-23 PROCEDURE — 87086 URINE CULTURE/COLONY COUNT: CPT | Performed by: UROLOGY

## 2023-02-23 PROCEDURE — 99214 OFFICE O/P EST MOD 30 MIN: CPT | Mod: S$PBB,,, | Performed by: UROLOGY

## 2023-02-23 RX ORDER — SULFAMETHOXAZOLE AND TRIMETHOPRIM 800; 160 MG/1; MG/1
1 TABLET ORAL 2 TIMES DAILY
Qty: 6 TABLET | Refills: 0 | Status: SHIPPED | OUTPATIENT
Start: 2023-02-23 | End: 2023-02-23

## 2023-02-23 RX ORDER — SULFAMETHOXAZOLE AND TRIMETHOPRIM 800; 160 MG/1; MG/1
1 TABLET ORAL 2 TIMES DAILY
Qty: 28 TABLET | Refills: 0 | Status: SHIPPED | OUTPATIENT
Start: 2023-02-23 | End: 2023-03-09

## 2023-02-23 NOTE — PROGRESS NOTES
[facility] [place] [facility]   Clinic Note    SUBJECTIVE:     Chief Complaint: Microscopic Hematuria    Referral from: Miller Moscoso MD.    History of Present Illness:  Latonia Zepeda is a 47 y.o. female who presents to clinic for evaluation of Microscopic Hematuria.    Patient here for microhematuria.  She presented to the emergency department with abdominal pain and was found to be positive for COVID.  She had a UA performed which revealed greater than 100 RBCs.  She denies any gross hematuria.  She did not have a urinary tract infection at that time.  She had a CT scan performed which revealed a punctate left sided stone which does not appear to be within the collecting system.  She was referred here for evaluation.    2023  Here for cystoscopy.  Patient reports that she has a boil in her perineum and today her urinalysis shows positive nitrates.    Patient endorses no additional complaints at this time.    Past Medical History:   Diagnosis Date    Hypertension        Past Surgical History:   Procedure Laterality Date    HERNIA REPAIR      HYSTERECTOMY      TUBAL LIGATION         No family history on file.    Social History     Tobacco Use    Smoking status: Never    Smokeless tobacco: Never   Substance Use Topics    Alcohol use: No    Drug use: Yes     Types: Marijuana     Comment: daily       Current Outpatient Medications on File Prior to Visit   Medication Sig Dispense Refill    acetaminophen (TYLENOL) 500 MG tablet Take 2 tablets (1,000 mg total) by mouth every 8 (eight) hours as needed for Pain or Temperature greater than (100.4). 20 tablet 0    albuterol (PROVENTIL/VENTOLIN HFA) 90 mcg/actuation inhaler Inhale 1-2 puffs into the lungs every 6 (six) hours as needed for Wheezing. Rescue 8 g 0    DUPIXENT  mg/2 mL PnIj SMARTSI Milligram(s) SUB-Q Every 2 Weeks      HUMIRA,CF, PEN 80 mg/0.8 mL PnKt Inject into the skin.      hydroCHLOROthiazide (HYDRODIURIL) 25 MG tablet Take 25 mg by mouth once  "daily.      ibuprofen (ADVIL,MOTRIN) 600 MG tablet Take 1 tablet (600 mg total) by mouth every 6 (six) hours as needed for Pain. 20 tablet 0    terbinafine HCL (LAMISIL) 250 mg tablet Take 250 mg by mouth once daily.      tolnaftate (TINACTIN) 1 % cream Apply topically 2 (two) times daily.      triamcinolone acetonide 0.1% (KENALOG) 0.1 % cream Apply topically 2 (two) times daily. 45 g 0    [DISCONTINUED] clotrimazole (LOTRIMIN) 1 % cream Apply to affected area 2 times daily 15 g 0    [DISCONTINUED] hydrocortisone 2.5 % ointment Apply topically 2 (two) times daily. 20 g 0     No current facility-administered medications on file prior to visit.       Review of patient's allergies indicates:  No Known Allergies    Review of Systems:  A review of 10+ systems was conducted with pertinent positive and negative findings documented in HPI with all other systems reviewed and negative.    OBJECTIVE:     Estimated body mass index is 28.36 kg/m² as calculated from the following:    Height as of 12/5/22: 5' 4" (1.626 m).    Weight as of this encounter: 75 kg (165 lb 3.8 oz).    Vital Signs (Most Recent)  There were no vitals filed for this visit.    Physical Exam:  GENERAL: patient sitting comfortably  HEENT: normocephalic  NECK: supple, no JVD  PULM: normal chest rise, no increased WOB  HEART: non-diaphoretic  ABDO: soft, nondistended, nontender  BACK: no CVA tenderness bilaterally  SKIN: warm, dry, well perfused  EXT: no bruising or edema  NEURO: grossly normal with no focal deficits  PSYCH: appropriate mood and affect    Genitourinary Exam:  A firm area of induration is present in the perineum 1 cm from the inferior aspect of the labia.  There is no fluctuance.  No drainable abscess.    Lab Results   Component Value Date    BUN 12 01/24/2023    CREATININE 0.8 01/24/2023    WBC 5.41 12/05/2022    HGB 12.9 12/05/2022    HCT 37.8 12/05/2022     12/05/2022    AST 23 12/05/2022    ALT 16 12/05/2022    ALKPHOS 63 12/05/2022 "    ALBUMIN 4.4 12/05/2022        PSA:  No results found for: PSA, PSADIAG, PSATOTAL, PSAFREE    Imaging:  All relevant imaging studies have been reviewed personally by me.    Results for orders placed or performed during the hospital encounter of 01/24/23 (from the past 2160 hour(s))   CT Urogram Abd Pelvis W WO    Narrative    EXAMINATION:  CT UROGRAM ABD PELVIS W WO    CLINICAL HISTORY:  Hematuria, microscopic, increased risk for urinary tract malignancy;Other microscopic hematuria    TECHNIQUE:  Low dose axial, sagittal and coronal reformations were obtained from the lung bases to the pubic symphysis before and following the IV administration of 125 mL of Omnipaque 350.  Postcontrast imaging was optimized for nephrogram and excretory renal phases.    COMPARISON:  None    FINDINGS:  Abdomen:    - Kidneys: Kidneys are normal in size, location, morphology and attenuation. No appreciable suspicious sizable lesion, nephroliths or hydroureteronephrosis bilaterally. No suspicious filling defects throughout the opacified segments of the collecting systems bilaterally.  Urinary bladder is well distended/opacified and grossly unremarkable without mural thickening, appreciable nodularity or other suspicious filling defects.    - Lower thorax:Heart is not enlarged.  No significant pericardial thickening in the field of view.    - Lung bases: Imaged portions of the lung bases are clear.    - Liver: Liver is normal in size, attenuations and morphology without appreciable surface nodularity. No appreciable sizable suspicious lesion.  No intrahepatic/extrahepatic biliary dilatation.    - Gallbladder: No radiodense gallstones, mural thickening, pericholecystic fluid or pericholecystic fat stranding.    - Pancreas: Pancreas is normal in size and attenuation without the surrounding inflammatory fat stranding, suspicious mass or fluid/fluid collection.    - Spleen: Spleen is normal in size, morphology and attenuation without  appreciable suspicious lesion.    - Adrenals: Bilateral adrenal glands are normal in size and morphology without appreciable nodularity.    - Bowel/Mesentery: Stomach, small bowel and colon are normal in course and caliber.  No evidence of small bowel obstruction, significant inflammatory fat stranding, free air or free fluid.  Appendix is normal.  Bowel mesentery is grossly unremarkable.    - Retroperitoneum:  Aorta is normal in course and caliber without evidence of aneurysmal degeneration.  No sizable retroperitoneal mass or fluid collection.    Pelvis:    - Reproductive organs: Status post hysterectomy.  Right adnexal cyst are incidentally noted.  No suspicious sizable pelvic mass, lymphadenopathy or fluid.    - Soft tissues:  Imaged soft tissues are grossly unremarkable.    - Bones:  Visualized osseous structures are grossly unremarkable.  No acute displaced fracture, dislocation or suspicious lytic/blastic lesion.      Impression    1. Kidneys are grossly normal in appearance without appreciable suspicious sizable parenchymal lesion, nephroliths or hydroureteronephrosis bilaterally.  2. No suspicious filling defects throughout the a pacified segments of the collecting systems/urinary bladder.      Electronically signed by: Karthik Mariee  Date:    01/24/2023  Time:    11:43   Results for orders placed or performed during the hospital encounter of 12/05/22 (from the past 2160 hour(s))   CT Renal Stone Study ABD Pelvis WO    Narrative    EXAMINATION:  CT RENAL STONE STUDY ABD PELVIS WO    CLINICAL HISTORY:  Flank pain, kidney stone suspected;    TECHNIQUE:  Low dose axial images, sagittal and coronal reformations were obtained from the lung bases to the pubic symphysis.  Contrast was not administered.    COMPARISON:  Pelvic ultrasound 05/14/2017    FINDINGS:  Imaged lung bases are clear.  Base of the heart is within normal limits.    Gallbladder is normally distended with intraluminal subtle layering  hyperattenuation suggesting sludge without evidence of acute cholecystitis.  Noncontrast appearance of the liver, pancreas, spleen, stomach, duodenum and bilateral adrenal glands are within normal limits.  No biliary ductal dilatation.  Suspected tiny accessory splenule anterior to the spleen.    Bilateral kidneys are normal in size, shape and location.  No hydronephrosis or significant perinephric stranding.  Punctate caliceal hyperattenuating focus at the left renal interpolar region suggesting a nonobstructing nephrolith.  No radiodense calculus definitively seen within the right collecting system, left ureter or urinary bladder.  Ureters are nondilated.  Urinary bladder is suboptimally distended.  Uterus not identified and likely surgically absent.  No adnexal mass.  No significant free fluid in the pelvis.  Pelvic phleboliths noted.    Appendix and terminal ileum are within normal limits.  Mild amount of scattered fecal material throughout the colon.  No evidence of bowel obstruction or acute inflammation.  No pneumatosis or portal venous gas.    No ascites, free air or lymphadenopathy by CT criteria.  Minimal scattered calcific atherosclerosis of the aorta which otherwise tapers normally.    Umbilical piercing jewelry noted with associated streak artifact.  Suspected tiny fat containing umbilical hernia.    Osseous structures appear intact.      Impression    Left renal suspected solitary punctate nonobstructing nephrolith.    Otherwise no acute process seen within the abdomen or pelvis on this noncontrast study.    Suspected gallbladder sludge without acute cholecystitis.    Remote cholecystectomy.    Few additional findings as above.      Electronically signed by: Jamil Hernández MD  Date:    12/05/2022  Time:    13:15     No results found for this or any previous visit (from the past 2160 hour(s)).    Urinalysis:  Urinalysis performed today revealed positive nitrites, trace leukocyte esterase, and positive  blood.        ASSESSMENT     1. Folliculitis    2. Microscopic hematuria    3. Cystitis        PLAN:     Microscopic Hematuria, UTI    - Will defer cysto until UTI and folliculutis are treated.    2. Folliculitis    - firm area of induration but no obvious drainable abscess.  Will treat with antibiotics.  Patient can follow up after treatment for cystoscopy.  Return precautions provided.    Miller Moscoso MD  Urology  [facility]    Disclaimer: This note has been generated using voice-recognition software. There may be typographical errors that have been missed during proof-reading.

## 2023-02-25 LAB — BACTERIA UR CULT: NORMAL

## 2023-03-15 ENCOUNTER — PROCEDURE VISIT (OUTPATIENT)
Dept: UROLOGY | Facility: CLINIC | Age: 48
End: 2023-03-15
Payer: MEDICAID

## 2023-03-15 DIAGNOSIS — R31.29 MICROSCOPIC HEMATURIA: Primary | ICD-10-CM

## 2023-03-15 DIAGNOSIS — N95.2 VAGINAL ATROPHY: ICD-10-CM

## 2023-03-15 PROCEDURE — 52000 CYSTOSCOPY: ICD-10-PCS | Mod: S$PBB,,, | Performed by: STUDENT IN AN ORGANIZED HEALTH CARE EDUCATION/TRAINING PROGRAM

## 2023-03-15 PROCEDURE — 52000 CYSTOURETHROSCOPY: CPT | Mod: PBBFAC | Performed by: STUDENT IN AN ORGANIZED HEALTH CARE EDUCATION/TRAINING PROGRAM

## 2023-03-15 PROCEDURE — 81001 URINALYSIS AUTO W/SCOPE: CPT | Mod: PBBFAC | Performed by: STUDENT IN AN ORGANIZED HEALTH CARE EDUCATION/TRAINING PROGRAM

## 2023-03-15 RX ORDER — ESTRADIOL 0.1 MG/G
1 CREAM VAGINAL
Qty: 42.5 G | Refills: 11 | Status: SHIPPED | OUTPATIENT
Start: 2023-03-16 | End: 2024-03-15

## 2023-03-15 NOTE — PROCEDURES
Cystoscopy    Date/Time: 3/15/2023 10:15 AM  Performed by: Cuco Tavares MD  Authorized by: Miller Moscoso MD     Consent Done?:  Yes (Written)  Timeout: prior to procedure the correct patient, procedure, and site was verified    Prep: patient was prepped and draped in usual sterile fashion    Local anesthesia used?: Yes    Anesthesia:  Intraurethral instillation  Indications: hematuria    Position:  Dorsal lithotomy  Anesthesia:  Intraurethral instillation  Patient sedated?: No    Preparation: Patient was prepped and draped in usual sterile fashion    Scope type:  Flexible cystoscope  External exam performed: boil has resolved, narrow meatal opening.    Bladder neck normal: Yes    Bladder normal: Yes     patient tolerated the procedure well with no immediate complications  Comments:      No bladder tumors  Bilateral ureteral orifices with clear efflux  Vaginal atrophy with symptoms of dryness with intercourse, topical estrogen cream Rx discussed and provided  FH pos for SS, labs ordered    RTC 6 months

## 2023-03-16 ENCOUNTER — LAB VISIT (OUTPATIENT)
Dept: LAB | Facility: HOSPITAL | Age: 48
End: 2023-03-16
Attending: STUDENT IN AN ORGANIZED HEALTH CARE EDUCATION/TRAINING PROGRAM
Payer: MEDICAID

## 2023-03-16 ENCOUNTER — TELEPHONE (OUTPATIENT)
Dept: UROLOGY | Facility: CLINIC | Age: 48
End: 2023-03-16
Payer: MEDICAID

## 2023-03-16 DIAGNOSIS — R31.29 MICROSCOPIC HEMATURIA: ICD-10-CM

## 2023-03-16 LAB
BILIRUB SERPL-MCNC: NEGATIVE MG/DL
BLOOD URINE, POC: 50
COLOR, POC UA: YELLOW
GLUCOSE UR QL STRIP: NORMAL
HGB S BLD QL SOLY: NEGATIVE
KETONES UR QL STRIP: NEGATIVE
LEUKOCYTE ESTERASE URINE, POC: NEGATIVE
NITRITE, POC UA: NEGATIVE
PH, POC UA: 7
PROTEIN, POC: NEGATIVE
SPECIFIC GRAVITY, POC UA: 1015
UROBILINOGEN, POC UA: NORMAL

## 2023-03-16 PROCEDURE — 85660 RBC SICKLE CELL TEST: CPT | Performed by: STUDENT IN AN ORGANIZED HEALTH CARE EDUCATION/TRAINING PROGRAM

## 2023-03-16 PROCEDURE — 36415 COLL VENOUS BLD VENIPUNCTURE: CPT | Performed by: STUDENT IN AN ORGANIZED HEALTH CARE EDUCATION/TRAINING PROGRAM

## 2023-03-16 NOTE — TELEPHONE ENCOUNTER
I LM for pt letting her know that she can start her cream whenever she would like just as long as she follows the instructions on the medication.

## 2023-03-30 ENCOUNTER — HOSPITAL ENCOUNTER (EMERGENCY)
Facility: HOSPITAL | Age: 48
Discharge: HOME | End: 2023-03-30
Attending: EMERGENCY MEDICINE
Payer: MEDICAID

## 2023-03-30 VITALS
HEART RATE: 63 BPM | SYSTOLIC BLOOD PRESSURE: 153 MMHG | OXYGEN SATURATION: 99 % | TEMPERATURE: 98 F | BODY MASS INDEX: 28.68 KG/M2 | HEIGHT: 64 IN | RESPIRATION RATE: 16 BRPM | WEIGHT: 168 LBS | DIASTOLIC BLOOD PRESSURE: 92 MMHG

## 2023-03-30 DIAGNOSIS — L73.9 FOLLICULITIS: Primary | ICD-10-CM

## 2023-03-30 PROCEDURE — 99284 EMERGENCY DEPT VISIT MOD MDM: CPT

## 2023-03-30 PROCEDURE — 96372 THER/PROPH/DIAG INJ SC/IM: CPT

## 2023-03-30 PROCEDURE — 63600175 PHARM REV CODE 636 W HCPCS

## 2023-03-30 RX ORDER — IBUPROFEN 600 MG/1
600 TABLET ORAL EVERY 6 HOURS PRN
Qty: 20 TABLET | Refills: 0 | Status: SHIPPED | OUTPATIENT
Start: 2023-03-30

## 2023-03-30 RX ORDER — LIDOCAINE HYDROCHLORIDE 10 MG/ML
10 INJECTION, SOLUTION EPIDURAL; INFILTRATION; INTRACAUDAL; PERINEURAL ONCE
Status: DISCONTINUED | OUTPATIENT
Start: 2023-03-30 | End: 2023-03-30

## 2023-03-30 RX ORDER — DOXYCYCLINE 100 MG/1
100 CAPSULE ORAL 2 TIMES DAILY
Qty: 14 CAPSULE | Refills: 0 | Status: SHIPPED | OUTPATIENT
Start: 2023-03-30 | End: 2023-04-06

## 2023-03-30 RX ORDER — KETOROLAC TROMETHAMINE 30 MG/ML
15 INJECTION, SOLUTION INTRAMUSCULAR; INTRAVENOUS
Status: COMPLETED | OUTPATIENT
Start: 2023-03-30 | End: 2023-03-30

## 2023-03-30 RX ORDER — ACETAMINOPHEN 500 MG
500 TABLET ORAL EVERY 6 HOURS PRN
Qty: 20 TABLET | Refills: 0 | Status: SHIPPED | OUTPATIENT
Start: 2023-03-30

## 2023-03-30 RX ADMIN — KETOROLAC TROMETHAMINE 15 MG: 30 INJECTION, SOLUTION INTRAMUSCULAR; INTRAVENOUS at 08:03

## 2023-03-30 NOTE — Clinical Note
"Latonia Hawkins" Katelyn was seen and treated in our emergency department on 3/30/2023.  She may return to work on 04/01/2023.       If you have any questions or concerns, please don't hesitate to call.      Alayna Holdsworth, PA-C"

## 2023-03-30 NOTE — DISCHARGE INSTRUCTIONS
Thank you for coming to our Emergency Department today. It is important to remember that some problems are difficult to diagnose and may not be found during your first visit. Be sure to follow up with your primary care doctor and review any labs/imaging that was performed with them. If you do not have a primary care doctor, you may contact the one listed on your discharge paperwork or you may also call the [facility] Appointment Desk at [phone] to schedule an appointment with one.     All medications may potentially have side effects and it is impossible to predict which medications may give you side effects. If you feel that you are having a negative effect of any medication you should immediately stop taking them and seek medical attention.    Return to the ER with any questions/concerns, new/concerning symptoms, worsening or failure to improve. Do not drive or make any important decisions for 24 hours if you have received any pain medications, sedatives or mood altering drugs during your ER visit.

## 2023-03-30 NOTE — ED PROVIDER NOTES
Encounter Date: 3/30/2023    SCRIBE #1 NOTE: I, Arsh Copeland, am scribing for, and in the presence of,  Alayna Holdsworth, PA-C. I have scribed the following portions of the note - Other sections scribed: HPI, ROS.     History     Chief Complaint   Patient presents with    Abscess     The patient reports abscesses left axillary, left groin, and external genitalia x 3 days. Denies drainage from sites, fevers, chills. Patient reports a hx of HS and states that she is taking humira for it.     Latonia Zepeda is a 47 y.o. female with a PMHx of HTN and hidradenitis suppurativa, who presents to the ED for evaluation of a left axillary abscess onset 3 days ago. Patient also c/o a left groin abscess, pelvic abscess and vomiting x1. Patient states states the pain worsens with movement. Rates the pain 5/10. She has tried warm compresses at home to alleviate the symptoms with no relief. She has had one episode of emesis this morning. She is not currently on any antibiotics. Denies drainage, fever, dizziness, lightheadedness and HA.    The history is provided by the patient. No  was used.   Review of patient's allergies indicates:  No Known Allergies  Past Medical History:   Diagnosis Date    Hypertension      Past Surgical History:   Procedure Laterality Date    HERNIA REPAIR      HYSTERECTOMY      TUBAL LIGATION       No family history on file.  Social History     Tobacco Use    Smoking status: Never    Smokeless tobacco: Never   Substance Use Topics    Alcohol use: No    Drug use: Yes     Types: Marijuana     Comment: daily     Review of Systems   Constitutional:  Negative for chills, diaphoresis and fever.   HENT:  Negative for congestion.    Respiratory:  Negative for cough and shortness of breath.    Cardiovascular:  Negative for chest pain.   Gastrointestinal:  Positive for vomiting (x1). Negative for abdominal pain, constipation, diarrhea and nausea.   Genitourinary:  Negative for dysuria, flank pain,  frequency and urgency.   Musculoskeletal:  Negative for back pain.   Skin:  Negative for rash.        (+) abscess (-) drainage   Neurological:  Negative for dizziness, weakness, light-headedness and headaches.     Physical Exam     Initial Vitals [03/30/23 0748]   BP Pulse Resp Temp SpO2   (!) 153/92 63 16 97.8 °F (36.6 °C) 99 %      MAP       --         Physical Exam    Nursing note and vitals reviewed.  Constitutional: She appears well-developed and well-nourished. She is not diaphoretic. She is active. She does not appear ill. No distress.   HENT:   Head: Normocephalic and atraumatic.   Right Ear: External ear normal.   Left Ear: External ear normal.   Nose: Nose normal.   Eyes: Conjunctivae, EOM and lids are normal. Pupils are equal, round, and reactive to light. Right eye exhibits no discharge. Left eye exhibits no discharge.   Neck: Neck supple.   Normal range of motion.   Full passive range of motion without pain.     Cardiovascular:  Normal rate and regular rhythm.           Pulmonary/Chest: Effort normal and breath sounds normal. No respiratory distress.   Abdominal: She exhibits no distension.   Musculoskeletal:         General: Normal range of motion.      Cervical back: Full passive range of motion without pain, normal range of motion and neck supple.     Neurological: She is alert and oriented to person, place, and time. GCS eye subscore is 4. GCS verbal subscore is 5. GCS motor subscore is 6.   Skin: Skin is dry. Capillary refill takes less than 2 seconds.   1 cm left axilla, 1 cm left groin, and 0.5 cm left labia boil appreciated. No discharge, warmth, erythema, or swelling.       ED Course   Procedures  Labs Reviewed - No data to display       Imaging Results    None          Medications   ketorolac injection 15 mg (15 mg Intramuscular Given 3/30/23 4220)     Medical Decision Making:   History:   Old Medical Records: I decided to obtain old medical records.  Initial Assessment:   47 y.o. female with  a PMHx of HTN and hidradenitis suppurativa, who presents to the ED for evaluation of a left axillary abscess.  Patient's chart and medical history reviewed.  Differential Diagnosis:   Abscess  Cyst  Folliculitis   Cellulitis  HS  ED Management:  Patient's vitals reviewed.  She is afebrile, no respiratory distress, nontoxic-appearing in the ED. Patient had 1 cm left axilla, 1 cm left groin, and 0.5 cm left labia boil appreciated. No discharge, warmth, erythema, or swelling.  Patient given Toradol for pain.  Discussed with patient these are not big enough to be drained.  Patient will be sent home on doxycycline as well as Motrin and Tylenol as needed for pain.  Instructed patient to do warm compresses, she verbalized understanding.  Patient will follow-up with her dermatologist. Patient agrees with this plan. Discussed with her strict return precautions, she verbalized understanding. Patient is stable for discharge.         Scribe Attestation:   Scribe #1: I performed the above scribed service and the documentation accurately describes the services I performed. I attest to the accuracy of the note.            I, Alayna Holdsworth,PA-C, personally performed the services described in this documentation.  All medical record entries made by the scribe were at my direction and in my presence.  I have reviewed the chart and agree that the record reflects my personal performance and is accurate and complete.        Clinical Impression:   Final diagnoses:  [L73.9] Folliculitis (Primary)        ED Disposition Condition    Discharge Stable          ED Prescriptions       Medication Sig Dispense Start Date End Date Auth. Provider    doxycycline (VIBRAMYCIN) 100 MG Cap Take 1 capsule (100 mg total) by mouth 2 (two) times daily. for 7 days 14 capsule 3/30/2023 4/6/2023 Alayna Holdsworth, PA-C    ibuprofen (ADVIL,MOTRIN) 600 MG tablet Take 1 tablet (600 mg total) by mouth every 6 (six) hours as needed for Pain. 20 tablet 3/30/2023  -- Alayna Holdsworth, PA-C    acetaminophen (TYLENOL) 500 MG tablet Take 1 tablet (500 mg total) by mouth every 6 (six) hours as needed for Temperature greater than or Pain. 20 tablet 3/30/2023 -- Alayna Holdsworth, PA-C          Follow-up Information       Follow up With Specialties Details Why Contact Info    [facility]   [address] [place] [id number]  [phone]               Alayna Holdsworth, PA-C  03/30/23 0985

## 2023-10-16 ENCOUNTER — OFFICE VISIT (OUTPATIENT)
Dept: UROLOGY | Facility: CLINIC | Age: 48
End: 2023-10-16
Payer: MEDICAID

## 2023-10-16 VITALS — WEIGHT: 167.44 LBS | BODY MASS INDEX: 28.74 KG/M2

## 2023-10-16 DIAGNOSIS — N95.2 VAGINAL ATROPHY: Primary | ICD-10-CM

## 2023-10-16 PROCEDURE — 1159F MED LIST DOCD IN RCRD: CPT | Mod: CPTII,,, | Performed by: STUDENT IN AN ORGANIZED HEALTH CARE EDUCATION/TRAINING PROGRAM

## 2023-10-16 PROCEDURE — 99213 PR OFFICE/OUTPT VISIT, EST, LEVL III, 20-29 MIN: ICD-10-PCS | Mod: S$PBB,,, | Performed by: STUDENT IN AN ORGANIZED HEALTH CARE EDUCATION/TRAINING PROGRAM

## 2023-10-16 PROCEDURE — 1160F PR REVIEW ALL MEDS BY PRESCRIBER/CLIN PHARMACIST DOCUMENTED: ICD-10-PCS | Mod: CPTII,,, | Performed by: STUDENT IN AN ORGANIZED HEALTH CARE EDUCATION/TRAINING PROGRAM

## 2023-10-16 PROCEDURE — 99999 PR PBB SHADOW E&M-EST. PATIENT-LVL III: CPT | Mod: PBBFAC,,, | Performed by: STUDENT IN AN ORGANIZED HEALTH CARE EDUCATION/TRAINING PROGRAM

## 2023-10-16 PROCEDURE — 99213 OFFICE O/P EST LOW 20 MIN: CPT | Mod: PBBFAC | Performed by: STUDENT IN AN ORGANIZED HEALTH CARE EDUCATION/TRAINING PROGRAM

## 2023-10-16 PROCEDURE — 1160F RVW MEDS BY RX/DR IN RCRD: CPT | Mod: CPTII,,, | Performed by: STUDENT IN AN ORGANIZED HEALTH CARE EDUCATION/TRAINING PROGRAM

## 2023-10-16 PROCEDURE — 3008F BODY MASS INDEX DOCD: CPT | Mod: CPTII,,, | Performed by: STUDENT IN AN ORGANIZED HEALTH CARE EDUCATION/TRAINING PROGRAM

## 2023-10-16 PROCEDURE — 3008F PR BODY MASS INDEX (BMI) DOCUMENTED: ICD-10-PCS | Mod: CPTII,,, | Performed by: STUDENT IN AN ORGANIZED HEALTH CARE EDUCATION/TRAINING PROGRAM

## 2023-10-16 PROCEDURE — 99213 OFFICE O/P EST LOW 20 MIN: CPT | Mod: S$PBB,,, | Performed by: STUDENT IN AN ORGANIZED HEALTH CARE EDUCATION/TRAINING PROGRAM

## 2023-10-16 PROCEDURE — 1159F PR MEDICATION LIST DOCUMENTED IN MEDICAL RECORD: ICD-10-PCS | Mod: CPTII,,, | Performed by: STUDENT IN AN ORGANIZED HEALTH CARE EDUCATION/TRAINING PROGRAM

## 2023-10-16 PROCEDURE — 99999 PR PBB SHADOW E&M-EST. PATIENT-LVL III: ICD-10-PCS | Mod: PBBFAC,,, | Performed by: STUDENT IN AN ORGANIZED HEALTH CARE EDUCATION/TRAINING PROGRAM

## 2023-10-16 NOTE — PROGRESS NOTES
Patient ID: Latonia Zepeda is a 47 y.o. female.    Chief Complaint: Follow-up    Referral: No referring provider defined for this encounter.     HPI  47 y.o. who presents to the Urology clinic for evaluation of hx of microscopic hematuria, unrevealing work up. Patient noted to have vaginal atrophy, recommended to start topical estrogen. She notes she ran out of medication, has not refilled it. Patient not sexually active at this time.   Denies voiding concerns  Denies gross hematuria.     Medically Necessary ROS documented in HPI    Past Medical History  Active Ambulatory Problems     Diagnosis Date Noted    No Active Ambulatory Problems     Resolved Ambulatory Problems     Diagnosis Date Noted    No Resolved Ambulatory Problems     Past Medical History:   Diagnosis Date    Hypertension          Past Surgical History  Past Surgical History:   Procedure Laterality Date    HERNIA REPAIR      HYSTERECTOMY      TUBAL LIGATION         Social History  Social Connections: Not on file       Medications    Current Outpatient Medications:     DUPIXENT  mg/2 mL PnIj, SMARTSI Milligram(s) SUB-Q Every 2 Weeks, Disp: , Rfl:     estradioL (ESTRACE) 0.01 % (0.1 mg/gram) vaginal cream, Place 1 g vaginally twice a week., Disp: 42.5 g, Rfl: 11    HUMIRA,CF, PEN 80 mg/0.8 mL PnKt, Inject into the skin., Disp: , Rfl:     hydroCHLOROthiazide (HYDRODIURIL) 25 MG tablet, Take 25 mg by mouth once daily., Disp: , Rfl:     acetaminophen (TYLENOL) 500 MG tablet, Take 2 tablets (1,000 mg total) by mouth every 8 (eight) hours as needed for Pain or Temperature greater than (100.4)., Disp: 20 tablet, Rfl: 0    acetaminophen (TYLENOL) 500 MG tablet, Take 1 tablet (500 mg total) by mouth every 6 (six) hours as needed for Temperature greater than or Pain., Disp: 20 tablet, Rfl: 0    albuterol (PROVENTIL/VENTOLIN HFA) 90 mcg/actuation inhaler, Inhale 1-2 puffs into the lungs every 6 (six) hours as needed for Wheezing. Rescue, Disp: 8 g,  Rfl: 0    ibuprofen (ADVIL,MOTRIN) 600 MG tablet, Take 1 tablet (600 mg total) by mouth every 6 (six) hours as needed for Pain., Disp: 20 tablet, Rfl: 0    ibuprofen (ADVIL,MOTRIN) 600 MG tablet, Take 1 tablet (600 mg total) by mouth every 6 (six) hours as needed for Pain., Disp: 20 tablet, Rfl: 0    terbinafine HCL (LAMISIL) 250 mg tablet, Take 250 mg by mouth once daily., Disp: , Rfl:     tolnaftate (TINACTIN) 1 % cream, Apply topically 2 (two) times daily., Disp: , Rfl:     triamcinolone acetonide 0.1% (KENALOG) 0.1 % cream, Apply topically 2 (two) times daily., Disp: 45 g, Rfl: 0    Allergies  Review of patient's allergies indicates:  No Known Allergies    Patient's PMH, FH, Social hx, Medications, allergies reviewed and updated as pertinent to today's visit    Objective:      Physical Exam  Constitutional:       Appearance: She is well-developed.   HENT:      Head: Normocephalic and atraumatic.   Eyes:      Conjunctiva/sclera: Conjunctivae normal.   Pulmonary:      Effort: Pulmonary effort is normal. No respiratory distress.   Abdominal:      General: Abdomen is flat. There is no distension.      Palpations: Abdomen is soft. There is no mass.      Tenderness: There is no abdominal tenderness. There is no right CVA tenderness, left CVA tenderness or guarding.   Skin:     General: Skin is warm.      Findings: No rash.   Neurological:      Mental Status: She is alert and oriented to person, place, and time.   Psychiatric:         Behavior: Behavior normal.           -  Assessment:       1. Vaginal atrophy        Plan:         Discussed with patient she has refills until March 2024 for her topical estrogen  Continue    POCT UA w/o signs of infection

## 2023-10-16 NOTE — LETTER
October 16, 2023      [facility]  [address]  [place] [address]  Phone: [phone]  Fax: [phone]       Patient: Latonia Zepeda   YOB: 1975  Date of Visit: 10/16/2023    To Whom It May Concern:    Emilia Zepeda  was at [facility] on 10/16/2023. The patient may return to work/school on 10/16/23 with no restrictions. If you have any questions or concerns, or if I can be of further assistance, please do not hesitate to contact me.    Sincerely,    Matthew Boyd MA

## 2024-10-22 ENCOUNTER — HOSPITAL ENCOUNTER (EMERGENCY)
Facility: HOSPITAL | Age: 49
Discharge: HOME | End: 2024-10-22
Attending: EMERGENCY MEDICINE
Payer: MEDICAID

## 2024-10-22 VITALS
SYSTOLIC BLOOD PRESSURE: 141 MMHG | RESPIRATION RATE: 16 BRPM | HEART RATE: 86 BPM | DIASTOLIC BLOOD PRESSURE: 93 MMHG | WEIGHT: 170 LBS | TEMPERATURE: 98 F | HEIGHT: 64 IN | BODY MASS INDEX: 29.02 KG/M2 | OXYGEN SATURATION: 100 %

## 2024-10-22 DIAGNOSIS — M25.512 ACUTE PAIN OF LEFT SHOULDER: Primary | ICD-10-CM

## 2024-10-22 DIAGNOSIS — S49.90XA SHOULDER INJURY: ICD-10-CM

## 2024-10-22 PROCEDURE — 96372 THER/PROPH/DIAG INJ SC/IM: CPT

## 2024-10-22 PROCEDURE — 63600175 PHARM REV CODE 636 W HCPCS

## 2024-10-22 PROCEDURE — 99284 EMERGENCY DEPT VISIT MOD MDM: CPT | Mod: 25

## 2024-10-22 RX ORDER — LIDOCAINE 50 MG/G
1 PATCH TOPICAL ONCE
Qty: 15 PATCH | Refills: 0 | Status: SHIPPED | OUTPATIENT
Start: 2024-10-22 | End: 2024-10-22

## 2024-10-22 RX ORDER — NAPROXEN 500 MG/1
500 TABLET ORAL 2 TIMES DAILY
Qty: 20 TABLET | Refills: 0 | Status: SHIPPED | OUTPATIENT
Start: 2024-10-22

## 2024-10-22 RX ORDER — CYCLOBENZAPRINE HCL 10 MG
10 TABLET ORAL 3 TIMES DAILY PRN
Qty: 20 TABLET | Refills: 0 | Status: SHIPPED | OUTPATIENT
Start: 2024-10-22 | End: 2024-11-01

## 2024-10-22 RX ORDER — KETOROLAC TROMETHAMINE 30 MG/ML
15 INJECTION, SOLUTION INTRAMUSCULAR; INTRAVENOUS
Status: COMPLETED | OUTPATIENT
Start: 2024-10-22 | End: 2024-10-22

## 2024-10-22 RX ADMIN — KETOROLAC TROMETHAMINE 15 MG: 30 INJECTION, SOLUTION INTRAMUSCULAR at 07:10

## 2024-10-22 NOTE — DISCHARGE INSTRUCTIONS

## 2024-10-22 NOTE — Clinical Note
"Latonia Hawkins" Katelyn was seen and treated in our emergency department on 10/22/2024.  She may return to work on 10/24/2024.       If you have any questions or concerns, please don't hesitate to call.      Jacob Yang PA-C"

## 2024-10-22 NOTE — ED PROVIDER NOTES
Encounter Date: 10/22/2024    SCRIBE #1 NOTE: I, Konrad Mendieta, am scribing for, and in the presence of,  Jacob Yang PA-C. Other sections scribed: HPI, ROS.       History     Chief Complaint   Patient presents with    Shoulder Pain     L shoulder pain since yesterday while at work. No swelling, +full ROM, but tender to touch.     CC: Shoulder pain    HPI: History is obtained from independent historian. 48 y.o. F who has a PMHx of HTN who presents to the ED for emergent evaluation of acute left shoulder pain after pulling on a bed while at work yesterday. 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 Pt states that the left shoulder pain radiates down the left upper extremity. She reports difficulty with raising her left arm and getting dressed this morning due to the left shoulder pain. No OTC treatment attempted PTA. Pt has no known drug allergies. Pt has a PMHx of Eczema and reports use of Tinactin. She also reports a PMHx of Hidradenitis suppurativa that is treated with receiving an injection once per month. Pt denies syncope, head trauma, or fall.      The history is provided by the patient. No  was used.     Review of patient's allergies indicates:  No Known Allergies  Past Medical History:   Diagnosis Date    Hypertension      Past Surgical History:   Procedure Laterality Date    HERNIA REPAIR      HYSTERECTOMY      TUBAL LIGATION       No family history on file.  Social History     Tobacco Use    Smoking status: Never    Smokeless tobacco: Never   Substance Use Topics    Alcohol use: No    Drug use: Yes     Types: Marijuana     Comment: daily     Review of Systems   Constitutional:  Negative for chills and fever.   HENT:  Negative for rhinorrhea and sore throat.    Respiratory:  Negative for cough and shortness of breath.    Cardiovascular:  Negative for chest pain.   Gastrointestinal:  Negative for abdominal pain, constipation, diarrhea, nausea and vomiting.   Genitourinary:  Negative  for dysuria.   Musculoskeletal:  Positive for arthralgias and myalgias.   Neurological:  Negative for syncope.        (-) head trauma  (-) LOC   All other systems reviewed and are negative.      Physical Exam     Initial Vitals [10/22/24 0707]   BP Pulse Resp Temp SpO2   (!) 141/93 86 16 98.2 °F (36.8 °C) 100 %      MAP       --         Physical Exam    Nursing note and vitals reviewed.  Constitutional: She appears well-developed and well-nourished.  Non-toxic appearance. She does not appear ill.   HENT:   Head: Normocephalic and atraumatic.   Right Ear: Hearing, tympanic membrane, external ear and ear canal normal. Tympanic membrane is not perforated, not erythematous and not bulging.   Left Ear: Hearing, tympanic membrane, external ear and ear canal normal. Tympanic membrane is not perforated, not erythematous and not bulging.   Nose: Nose normal. Mouth/Throat: Uvula is midline, oropharynx is clear and moist and mucous membranes are normal.   Eyes: Conjunctivae and EOM are normal.   Neck: Neck supple.   Normal range of motion.   Full passive range of motion without pain.     Cardiovascular:  Normal rate and regular rhythm.           Pulses:       Radial pulses are 2+ on the right side and 2+ on the left side.   Pulmonary/Chest: Effort normal and breath sounds normal. No accessory muscle usage. No respiratory distress. She has no decreased breath sounds.   Abdominal: Abdomen is soft. Bowel sounds are normal. She exhibits no distension. There is no abdominal tenderness. There is no rebound and no guarding.   Musculoskeletal:         General: Normal range of motion.      Cervical back: Full passive range of motion without pain, normal range of motion and neck supple. No rigidity.      Comments: Full ROM of neck. No neck rigidity. No midline tenderness to cervical, thoracic, or lumbar spine.  No bony step-offs.  Full range of motion of bilateral upper and lower extremities.  Strength and sensation intact bilateral  upper and lower extremities.  Patient able to ambulate into the room.  No erythema, edema, bruising, rash, or cellulitis patient's back or bilateral upper extremities.  Full range motion of bilateral shoulders, elbows, wrists, fingers.  Strength and sensation intact to bilateral upper extremities.  Equal distal pulses bilaterally.     Neurological: She is alert. No cranial nerve deficit.   Neuro intact.  Strength and sensation intact bilateral upper and lower extremities.   Skin: Skin is warm and dry.   Psychiatric: She has a normal mood and affect.         ED Course   Procedures  Labs Reviewed - No data to display       Imaging Results              X-Ray Shoulder Trauma Left (Final result)  Result time 10/22/24 07:47:06      Final result by Ulises Montana MD (10/22/24 07:47:06)                   Impression:      No acute displaced fracture.      Electronically signed by: Ulises Montana MD  Date:    10/22/2024  Time:    07:47               Narrative:    EXAMINATION:  XR SHOULDER TRAUMA 3 VIEW LEFT    CLINICAL HISTORY:  Unspecified injury of shoulder and upper arm, unspecified arm, initial encounter.    TECHNIQUE:  Three views of the left shoulder were performed.    COMPARISON  None.    FINDINGS:  No acute displaced fracture.  No dislocation.  Soft tissues are symmetric.  No unexpected radiopaque foreign body.                                       Medications   ketorolac injection 15 mg (15 mg Intramuscular Given 10/22/24 0755)     Medical Decision Making  This is a  48 y.o. F who has a PMHx of HTN who presents to the ED for emergent evaluation of acute left shoulder pain after pulling on a bed while at work yesterday. 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 Pt states that the left shoulder pain radiates down the left upper extremity. She reports difficulty with raising her left arm and getting dressed this morning due to the left shoulder pain. No OTC treatment attempted PTA. Pt has no known drug allergies.  Pt has a PMHx of Eczema and reports use of Tinactin. She also reports a PMHx of Hidradenitis suppurativa that is treated with receiving an injection once per month. Pt denies syncope, head trauma, or fall.    On physical exam, patient is well-appearing and in no acute distress.  Nontoxic appearing.  Lungs are clear to auscultation bilaterally.  Abdomen is soft and nontender.  No guarding, rigidity, rebound.  2+ radial pulses bilaterally.  Posterior oropharynx is not erythematous.  No edema or exudate.  Uvula midline.  Bilateral tympanic membrane is normal.  No erythema, bulging, or perforations.  Neuro intact.  Strength and sensation intact bilateral upper and lower extremities.  Full ROM of neck. No neck rigidity. No midline tenderness to cervical, thoracic, or lumbar spine.  No bony step-offs.  Full range of motion of bilateral upper and lower extremities.  Strength and sensation intact bilateral upper and lower extremities.  Patient able to ambulate into the room.  No erythema, edema, bruising, rash, or cellulitis patient's back or bilateral upper extremities.  Full range motion of bilateral shoulders, elbows, wrists, fingers.  Strength and sensation intact to bilateral upper extremities.  Equal distal pulses bilaterally.  Toradol ordered.  Will reassess.  X-ray of left shoulder revealed no acute displaced fracture.  Will discharge patient on naproxen, Flexeril, Lidoderm patches.  Urged prompt follow-up with PCP for further evaluation.    Strict return precautions given. I discussed with the patient/family the diagnosis, treatment plan, indications for return to the emergency department, and for expected follow-up. The patient/family verbalized an understanding. The patient/family is asked if there are any questions or concerns. We discuss the case, until all issues are addressed to the patient/family's satisfaction. Patient/family understands and is agreeable to the plan. Patient is stable and ready for  discharge.      Amount and/or Complexity of Data Reviewed  Independent Historian:      Details: See HPI  Radiology: ordered.    Risk  Prescription drug management.            Scribe Attestation:   Scribe #1: I performed the above scribed service and the documentation accurately describes the services I performed. I attest to the accuracy of the note.                           I, Neldaelsa Trey, personally performed the services described in this documentation. All medical record entries made by the scribe were at my direction and in my presence. I have reviewed the chart and agree that the record reflects my personal performance and is accurate and complete.      DISCLAIMER: This note was prepared with [employer] voice recognition transcription software. Garbled syntax, mangled pronouns, and other bizarre constructions may be attributed to that software system.      Clinical Impression:  Final diagnoses:  [S49.90XA] Shoulder injury  [M25.512] Acute pain of left shoulder (Primary)          ED Disposition Condition    Discharge Stable          ED Prescriptions       Medication Sig Dispense Start Date End Date Auth. Provider    naproxen (NAPROSYN) 500 MG tablet Take 1 tablet (500 mg total) by mouth 2 (two) times daily. 20 tablet 10/22/2024 -- Jacob Yang PA-C    cyclobenzaprine (FLEXERIL) 10 MG tablet Take 1 tablet (10 mg total) by mouth 3 (three) times daily as needed for Muscle spasms. 20 tablet 10/22/2024 11/1/2024 Jacob Yang PA-C    LIDOcaine (LIDODERM) 5 % (Expires today) Place 1 patch onto the skin once. Remove & Discard patch within 12 hours or as directed by MD for 1 dose 15 patch 10/22/2024 10/22/2024 Jacob Yang PA-C          Follow-up Information       Follow up With Specialties Details Why Contact Info    Geraldine Galeana FNP Family Medicine In 2 days for further evaluation [address] [place] [id number]  [phone]      [facility] [place] Emergency Medicine In 2 days If symptoms  worsen [address] [place], [place] [address] [facility] [place] [id number] [phone]             Jacob Yang PA-C  10/22/24 0804

## 2025-06-13 ENCOUNTER — HOSPITAL ENCOUNTER (EMERGENCY)
Facility: HOSPITAL | Age: 50
Discharge: HOME | End: 2025-06-13
Attending: EMERGENCY MEDICINE
Payer: MEDICAID

## 2025-06-13 VITALS
HEIGHT: 64 IN | DIASTOLIC BLOOD PRESSURE: 80 MMHG | BODY MASS INDEX: 28.68 KG/M2 | RESPIRATION RATE: 16 BRPM | HEART RATE: 70 BPM | OXYGEN SATURATION: 99 % | TEMPERATURE: 98 F | SYSTOLIC BLOOD PRESSURE: 172 MMHG | WEIGHT: 168 LBS
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
## 2025-06-13 DIAGNOSIS — M79.645 PAIN OF LEFT THUMB: ICD-10-CM

## 2025-06-13 PROCEDURE — 99284 EMERGENCY DEPT VISIT MOD MDM: CPT | Mod: 25

## 2025-06-13 PROCEDURE — 29125 APPL SHORT ARM SPLINT STATIC: CPT | Mod: LT

## 2025-06-13 RX ORDER — ACETAMINOPHEN 500 MG
500 TABLET ORAL EVERY 6 HOURS PRN
Qty: 30 TABLET | Refills: 0 | Status: SHIPPED | OUTPATIENT
Start: 2025-06-13

## 2025-06-13 RX ORDER — METHOCARBAMOL 500 MG/1
1000 TABLET, FILM COATED ORAL 3 TIMES DAILY
Qty: 30 TABLET | Refills: 0 | Status: SHIPPED | OUTPATIENT
Start: 2025-06-13 | End: 2025-06-18

## 2025-06-13 RX ORDER — IBUPROFEN 600 MG/1
600 TABLET, FILM COATED ORAL EVERY 6 HOURS PRN
Qty: 20 TABLET | Refills: 0 | Status: SHIPPED | OUTPATIENT
Start: 2025-06-13

## 2025-06-13 NOTE — ED PROVIDER NOTES
Encounter Date: 6/13/2025    SCRIBE #1 NOTE: I, Vu Reis, am scribing for, and in the presence of,  Jose Luis Brody PA-C. I have scribed the following portions of the note - Other sections scribed: HPI, ROS, PE.       History     Chief Complaint   Patient presents with    Finger Pain     Intermittent LEFT thumb pain that radiates up to elbow x over a month. Denies injury. No tx used.      Latonia Zepeda is a 49 y.o. female, with a PMHx of HTN, who presents to the ED with finger pain on her left thumb for a month. Patient reports that she cannot hold or open anything with her hand due to the pain. 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 Patient has not attempted treatment. No other exacerbating or alleviating factors. Denies other associated symptoms. NKDA        The history is provided by the patient and medical records. No  was used.     Review of patient's allergies indicates:  No Known Allergies  Past Medical History:   Diagnosis Date    Hypertension      Past Surgical History:   Procedure Laterality Date    HERNIA REPAIR      HYSTERECTOMY      TUBAL LIGATION       No family history on file.  Social History[1]  Review of Systems   Constitutional:  Negative for chills, diaphoresis, fatigue and fever.   HENT:  Negative for congestion, rhinorrhea and sore throat.    Eyes:  Negative for redness and visual disturbance.   Respiratory:  Negative for cough and shortness of breath.    Cardiovascular:  Negative for chest pain, palpitations and leg swelling.   Gastrointestinal:  Negative for abdominal pain, diarrhea, nausea and vomiting.   Genitourinary:  Negative for difficulty urinating, dysuria, flank pain and frequency.   Musculoskeletal:  Positive for myalgias (left thumb). Negative for arthralgias, back pain, neck pain and neck stiffness.   Skin:  Negative for rash.   Neurological:  Negative for dizziness, weakness, light-headedness and headaches.    Hematological:  Does not bruise/bleed easily.       Physical Exam     Initial Vitals [06/13/25 1230]   BP Pulse Resp Temp SpO2   (!) 169/99 69 17 97.8 °F (36.6 °C) 98 %      MAP       --         Physical Exam    Nursing note and vitals reviewed.  Constitutional: She appears well-developed and well-nourished. She is not diaphoretic. No distress.   HENT:   Head: Normocephalic and atraumatic.   Right Ear: External ear normal.   Left Ear: External ear normal.   Nose: Nose normal. Mouth/Throat: Oropharynx is clear and moist.   Eyes: Conjunctivae and EOM are normal. Pupils are equal, round, and reactive to light. Right eye exhibits no discharge. Left eye exhibits no discharge.   Neck: Neck supple.   Normal range of motion.   Full passive range of motion without pain.     Cardiovascular:  Normal rate, regular rhythm, normal heart sounds and normal pulses.     Exam reveals no distant heart sounds and no friction rub.       Pulmonary/Chest: Effort normal and breath sounds normal. No respiratory distress.   Abdominal: Abdomen is soft. Bowel sounds are normal. She exhibits no distension, no pulsatile midline mass and no mass. There is no splenomegaly or hepatomegaly. There is no abdominal tenderness.   No right CVA tenderness.  No left CVA tenderness. There is no rebound and no guarding.   Musculoskeletal:         General: Normal range of motion.      Right shoulder: Normal.      Left shoulder: Normal.      Right elbow: Normal.      Left elbow: Normal.      Right wrist: Normal.      Left wrist: Normal.      Right hand: Normal.      Left hand: Tenderness and bony tenderness present. No swelling, deformity or lacerations. Normal range of motion. Normal strength. Normal sensation. There is no disruption of two-point discrimination. Normal capillary refill. Normal pulse.      Cervical back: Normal, full passive range of motion without pain, normal range of motion and neck supple.      Thoracic back: Normal.      Lumbar back:  Normal.      Right hip: Normal.      Left hip: Normal.      Right knee: Normal.      Left knee: Normal.      Right lower leg: Normal.      Left lower leg: Normal.      Right ankle: Normal.      Left ankle: Normal.      Right foot: Normal.      Left foot: Normal.      Comments: Focused exam of the left hand:  Full range of motion with 5/5 strength against resistance good cap refill.  2+ radial pulse.  Sensation intact.  Negative Finkelstein test.  That has tenderness along the 1st MCP medially.  No swelling or erythema.     Neurological: She is alert and oriented to person, place, and time. She has normal strength. No cranial nerve deficit or sensory deficit. Gait normal.   Skin: Skin is warm and dry. Capillary refill takes less than 2 seconds. No bruising, no ecchymosis and no rash noted. No pallor.   Psychiatric: She has a normal mood and affect. Her speech is normal and behavior is normal. Thought content normal.         ED Course   Splint Application    Date/Time: 6/13/2025 1:20 PM    Performed by: Jose Luis Brody PA-C  Authorized by: Ralph Adams MD  Location details: left hand  Splint type: thumb spica  Supplies used: aluminum splint (velcro)  Post-procedure: The splinted body part was neurovascularly unchanged following the procedure.  Patient tolerance: Patient tolerated the procedure well with no immediate complications        Labs Reviewed - No data to display       Imaging Results              X-Ray Hand 3 view Left (Final result)  Result time 06/13/25 13:06:58      Final result by Pablo Kwan III, MD (06/13/25 13:06:58)                   Narrative:    EXAMINATION:  XR HAND COMPLETE 3 VIEW LEFT    CLINICAL HISTORY:  1st MCP pain;    FINDINGS:  No radiopaque foreign body or erosion seen.  Is no fracture, dislocation, or bone destruction seen.  No acute trauma seen.      Electronically signed by: Pablo Kwan MD  Date:    06/13/2025  Time:    13:06                                      Medications - No data to display  Medical Decision Making  Latonia Zepeda is a 49 y.o. female, with a PMHx of HTN, who presents to the ED with finger pain on her left thumb for a month. Patient reports that she cannot hold or open anything with her hand due to the pain. 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 Patient has not attempted treatment. No other exacerbating or alleviating factors. Denies other associated symptoms. NKDA    Patient's chart and medical history reviewed.  Patient's vitals reviewed.  They are afebrile, no respiratory distress, nontoxic-appearing in the ED.  Differential diagnosis is considered the following.  - Septic Arthritis, Gout, Osteomyelitis: considered with pain, although unlikely without overlying erythema and swelling/edema, patient is afebrile  - Fracture/Dislocation: considered with pain, imaging ordered for further evaluation.  Per my interpretation there are no acute osseous abnormalities including fracture dislocation of the patient's hand.  - Contusion/Sprain/Strain: considered with pain with ROM   - Compartment Syndrome: unlikely with 2+ distal pulses, no pallor, no paresthesias, no woody induration.   - de Quervain tenosynovitis:  Unlikely with negative Finkelstein test location of pain not sensitive.  - carpal tunnel syndrome:  Considered with the patient's career and overuse however negative Phalen's and Tinel tunnel test.  Correlation with physical exam findings and imaging likely strain/sprain from overuse injury.  We will provide thumb spica splint for nighttime splinting discussed rice therapy and ibuprofen Tylenol for pain.  Advised resting and avoiding repetitive movements.   At this time I'll discharge home to follow up with primary care physician in the next 1-2 days for further evaluation.  If the pain continues the pt will need to see hand surgery for further evaluation.  The patient is comfortable with this plan and  comfortable going home at this time. After taking into careful account the historical factors and physical exam findings of the patient's presentation today, in conjunction with the empirical and objective data obtained on ED workup, no acute emergent medical condition has been identified. The patient appears to be low risk for an emergent medical condition and I feel it is safe and appropriate at this time for the patient to be discharged to follow-up as detailed in their discharge instructions for reevaluation and possible continued outpatient workup and management. I have discussed the specifics of the workup with the patient and the patient has verbalized understanding of the details of the workup, the diagnosis, the treatment plan, and the need for outpatient follow-up.  Although the patient has no emergent etiology today this does not preclude the development of an emergent condition so in addition, I have advised the patient that they can return to the ED and/or activate EMS at any time with worsening of their symptoms, change of their symptoms, or with any other medical complaint.  The patient remained comfortable and stable during their visit in the ED.  Discharge and follow-up instructions discussed with the patient who expressed understanding and willingness to comply with my recommendations. I discussed with the patient/family the diagnosis, treatment plan, indications for return to the emergency department, and for expected follow-up. Please follow up with your primary doctor in 1-2 days and return to the ED in any new, worsening, or continued symptoms. The patient/family verbalized an understanding. The patient/family is asked if there are any questions or concerns. We discuss the case, until all issues are addressed to the patient/family's satisfaction. Patient/family understands and is agreeable to the plan.    OPAL JUÁREZ PA-C    DISCLAIMER: [employer], a voice recognition system was utilized in  creating the note.      Amount and/or Complexity of Data Reviewed  Radiology: ordered and independent interpretation performed. Decision-making details documented in ED Course.    Risk  OTC drugs.  Prescription drug management.            Scribe Attestation:   Scribe #1: I performed the above scribed service and the documentation accurately describes the services I performed. I attest to the accuracy of the note.                           I, Jose Luis Brody PA-C, personally performed the services described in this documentation. All medical record entries made by the scribe were at my direction and in my presence. I have reviewed the chart and agree that the record reflects my personal performance and is accurate and complete.      DISCLAIMER: This note was prepared with [employer] voice recognition transcription software. Garbled syntax, mangled pronouns, and other bizarre constructions may be attributed to that software system.      Clinical Impression:  Final diagnoses:  [X50.3XXA] Overuse injury (Primary)  [M79.645] Pain of left thumb          ED Disposition Condition    Discharge Stable          ED Prescriptions       Medication Sig Dispense Start Date End Date Auth. Provider    acetaminophen (TYLENOL) 500 MG tablet Take 1 tablet (500 mg total) by mouth every 6 (six) hours as needed for Pain. 30 tablet 6/13/2025 -- Jose Luis Brody PA-C    ibuprofen (ADVIL,MOTRIN) 600 MG tablet Take 1 tablet (600 mg total) by mouth every 6 (six) hours as needed for Pain. 20 tablet 6/13/2025 -- Jose Luis Brody PA-C    methocarbamoL (ROBAXIN) 500 MG Tab Take 2 tablets (1,000 mg total) by mouth 3 (three) times daily. for 5 days 30 tablet 6/13/2025 6/18/2025 Jose Luis Brody PA-C          Follow-up Information       Follow up With Specialties Details Why Contact Info    Geraldine Galeana FNP Family Medicine Schedule an appointment as soon as possible for a visit in 1 day for follow up [address] [place] [address]  [id number]  [phone]      Your primary care physician  Schedule an appointment as soon as possible for a visit in 1 day for follow up regarding today's visit and for re-evaluation. Please follow up in 1-2 days.     [facility] [place] Emergency Medicine Go to  If you have new or worsening symptoms, or if you have any concerns at all. [address] [facility] [place] [id number] [phone]                 Jose Luis Brody PA-C  06/13/25 1317         [1]   Social History  Tobacco Use    Smoking status: Never    Smokeless tobacco: Never   Vaping Use    Vaping status: Never Used   Substance Use Topics    Alcohol use: No    Drug use: Yes     Types: Marijuana     Comment: daily        Jose Luis Brody PA-C  06/13/25 1326

## 2025-06-13 NOTE — DISCHARGE INSTRUCTIONS
Apply a compressive ACE bandage. Rest and elevate the affected painful area.  Apply cold compresses intermittently as needed.  As pain recedes, begin normal activities slowly as tolerated.  Call if symptoms persist.  If you have been prescribed Ibuprofen or Tylenol, these medications may be used for pain every 6-8 hours. Do not exceed a dose of 800 mg of Ibuprofen or a dose of 1000 mg of Tylenol in this 6-8 hour period. Do not exceed a daily dose of 4000 mg of Tylenol in a single day or 24 hour period. Do not exceed a daily dose of 1200 mg of Ibuprofen in a single day or 24 hours period. If you have been prescribed Naproxen, ibuprofen, Advil, Aleve, Motrin, Mobic, or other NSAID medications for pain. These are Non-Steroidal Anti-Inflammatory (NSAID) Medications. Please do not take them with any additional NSAIDs while you are taking this medication including (Advil, Aleve, Motrin, Ibuprofen, Mobic\meloxicam, Naprosyn, Toradol, ketoralac, etc.). Please stop taking this medication if you experience: weakness, itching, yellow skin or eyes, joint pains, vomiting blood, blood or black stools, unusual weight gain, or swelling in your arms, legs, hands, or feet. Do not take Ibuprofen on an empty stomach as this may cause upset stomach.   NSAID medications can be taken along with Tylenol (Acetaminophen).   Thank you for coming to our Emergency Department today. It is important to remember that some problems or medical conditions are difficult to diagnose and may not be found or addressed during your Emergency Department visit.  These conditions often start with non-specific symptoms and can only be diagnosed on follow up visits with your primary care physician or specialist when the symptoms continue or change. Please remember that all medical conditions can change, and we cannot predict how you will be feeling tomorrow or the next day. Return to the ER with any questions/concerns, new/concerning symptoms including fever,  chest pain, shortness of breath, loss of consciousness, dizziness, weakness, worsening symptoms, failure to improve, or any other concerns. Also, please follow up with your Primary Care Physician and/or Pediatrician in the next 1-2 days to review your ED visit in entirety and for re-evaluation.   Be sure to follow up with your primary care doctor and review all labs/imaging/tests that were performed during your ER visit with them. It is very common for us to identify non-emergent incidental findings which must be followed up with your primary care physician.  Some labs/imaging/tests may be outside of the normal range, and require non-emergent follow-up and/or further investigation/treatment/procedures/testing to help diagnose/exclude/prevent complications or other potentially serious medical conditions. Some abnormalities may not have been discussed or addressed during your ER visit. Some lab results may not return during your ER visit but can be accessible by downloading the free [facility] ollie or by visiting [email or web address] . It is important for you to review all labs/imaging/tests which are outside of the normal range with your physician.  An ER visit does not replace a primary care visit, and many screening tests or follow-up tests cannot be ordered by an ER doctor or performed by the ER. Some tests may even require pre-approval.  If you do not have a primary care doctor, you may contact the one listed on your discharge paperwork or you may also call the [facility] Appointment Desk at [phone] , or [address] [place] [address] at  [phone] to schedule an appointment, or establish care with a primary care doctor or even a specialist and to obtain information about local resources. It is important to your health that you have a primary care doctor.  Please take all medications as directed. We have done our best to select a medication for you that will treat your condition however, all medications may  potentially have side-effects and it is impossible to predict which medications may give you side-effects or what those side-effects (if any) those medications may give you.  If you feel that you are having a negative effect or side-effect of any medication you should stop taking those medications immediately and seek medical attention. If you feel that you are having a life-threatening reaction call 911.  Do not drive, swim, climb to height, take a bath, operate heavy machinery, drink alcohol or take potentially sedating medications, sign any legal documents or make any important decisions for 24 hours if you have received any pain medications, sedatives or mood altering drugs during your ER visit or within 24 hours of taking them if they have been prescribed to you.   You can find additional resources for Dentists, hearing aids, durable medical equipment, low cost pharmacies and other resources at [email or web address]  Patient agrees with this plan. Discussed with her strict return precautions, they verbalized understanding. Patient is stable for discharge.   § Please take all medication as prescribed.